# Patient Record
Sex: MALE | Race: WHITE
[De-identification: names, ages, dates, MRNs, and addresses within clinical notes are randomized per-mention and may not be internally consistent; named-entity substitution may affect disease eponyms.]

---

## 2017-01-25 ENCOUNTER — RX RENEWAL (OUTPATIENT)
Age: 82
End: 2017-01-25

## 2017-01-25 DIAGNOSIS — H59.099 OTHER DISORDERS OF UNSPECIFIED EYE FOLLOWING CATARACT SURGERY: ICD-10-CM

## 2017-01-25 RX ORDER — PREDNISOLONE ACETATE 10 MG/ML
1 SUSPENSION/ DROPS OPHTHALMIC
Qty: 5 | Refills: 3 | Status: ACTIVE | COMMUNITY
Start: 2017-01-25 | End: 1900-01-01

## 2017-01-25 RX ORDER — OFLOXACIN 3 MG/ML
0.3 SOLUTION/ DROPS OPHTHALMIC
Qty: 5 | Refills: 3 | Status: ACTIVE | COMMUNITY
Start: 2017-01-25 | End: 1900-01-01

## 2017-01-30 ENCOUNTER — OUTPATIENT (OUTPATIENT)
Dept: OUTPATIENT SERVICES | Facility: HOSPITAL | Age: 82
LOS: 1 days | Discharge: HOME | End: 2017-01-30
Payer: MEDICARE

## 2017-01-30 ENCOUNTER — APPOINTMENT (OUTPATIENT)
Dept: OPHTHALMOLOGY | Facility: AMBULATORY SURGERY CENTER | Age: 82
End: 2017-01-30

## 2017-01-30 PROCEDURE — 66984 XCAPSL CTRC RMVL W/O ECP: CPT | Mod: RT

## 2017-01-31 ENCOUNTER — APPOINTMENT (OUTPATIENT)
Dept: OPHTHALMOLOGY | Facility: CLINIC | Age: 82
End: 2017-01-31

## 2017-02-01 DIAGNOSIS — H26.9 UNSPECIFIED CATARACT: ICD-10-CM

## 2017-02-01 DIAGNOSIS — Z79.01 LONG TERM (CURRENT) USE OF ANTICOAGULANTS: ICD-10-CM

## 2017-02-01 DIAGNOSIS — Z87.891 PERSONAL HISTORY OF NICOTINE DEPENDENCE: ICD-10-CM

## 2017-02-01 DIAGNOSIS — Z84.1 FAMILY HISTORY OF DISORDERS OF KIDNEY AND URETER: ICD-10-CM

## 2017-02-01 DIAGNOSIS — Z96.653 PRESENCE OF ARTIFICIAL KNEE JOINT, BILATERAL: ICD-10-CM

## 2017-02-01 DIAGNOSIS — I48.2 CHRONIC ATRIAL FIBRILLATION: ICD-10-CM

## 2017-02-01 DIAGNOSIS — I10 ESSENTIAL (PRIMARY) HYPERTENSION: ICD-10-CM

## 2017-02-07 ENCOUNTER — APPOINTMENT (OUTPATIENT)
Dept: OPHTHALMOLOGY | Facility: CLINIC | Age: 82
End: 2017-02-07

## 2017-02-08 ENCOUNTER — RX RENEWAL (OUTPATIENT)
Age: 82
End: 2017-02-08

## 2017-02-08 RX ORDER — OFLOXACIN 3 MG/ML
0.3 SOLUTION/ DROPS OPHTHALMIC
Qty: 5 | Refills: 3 | Status: ACTIVE | COMMUNITY
Start: 2017-02-08 | End: 1900-01-01

## 2017-02-08 RX ORDER — PREDNISOLONE ACETATE 10 MG/ML
1 SUSPENSION/ DROPS OPHTHALMIC
Qty: 5 | Refills: 3 | Status: ACTIVE | COMMUNITY
Start: 2017-02-08 | End: 1900-01-01

## 2017-02-13 ENCOUNTER — APPOINTMENT (OUTPATIENT)
Dept: OPHTHALMOLOGY | Facility: AMBULATORY SURGERY CENTER | Age: 82
End: 2017-02-13

## 2017-02-13 ENCOUNTER — OUTPATIENT (OUTPATIENT)
Dept: OUTPATIENT SERVICES | Facility: HOSPITAL | Age: 82
LOS: 1 days | Discharge: HOME | End: 2017-02-13
Payer: MEDICARE

## 2017-02-13 PROCEDURE — 66984 XCAPSL CTRC RMVL W/O ECP: CPT | Mod: 79,LT

## 2017-02-14 ENCOUNTER — APPOINTMENT (OUTPATIENT)
Dept: OPHTHALMOLOGY | Facility: CLINIC | Age: 82
End: 2017-02-14

## 2017-02-17 DIAGNOSIS — I10 ESSENTIAL (PRIMARY) HYPERTENSION: ICD-10-CM

## 2017-02-17 DIAGNOSIS — H25.12 AGE-RELATED NUCLEAR CATARACT, LEFT EYE: ICD-10-CM

## 2017-02-17 DIAGNOSIS — I48.91 UNSPECIFIED ATRIAL FIBRILLATION: ICD-10-CM

## 2017-02-21 ENCOUNTER — APPOINTMENT (OUTPATIENT)
Dept: OPHTHALMOLOGY | Facility: CLINIC | Age: 82
End: 2017-02-21

## 2017-03-16 ENCOUNTER — APPOINTMENT (OUTPATIENT)
Dept: OPHTHALMOLOGY | Facility: CLINIC | Age: 82
End: 2017-03-16

## 2017-06-01 ENCOUNTER — APPOINTMENT (OUTPATIENT)
Dept: OPHTHALMOLOGY | Facility: CLINIC | Age: 82
End: 2017-06-01

## 2018-01-05 ENCOUNTER — APPOINTMENT (OUTPATIENT)
Dept: OPHTHALMOLOGY | Facility: CLINIC | Age: 83
End: 2018-01-05
Payer: MEDICARE

## 2018-02-02 ENCOUNTER — APPOINTMENT (OUTPATIENT)
Dept: OPHTHALMOLOGY | Facility: CLINIC | Age: 83
End: 2018-02-02
Payer: MEDICARE

## 2018-02-02 PROCEDURE — 92014 COMPRE OPH EXAM EST PT 1/>: CPT

## 2019-01-23 ENCOUNTER — HOSPITAL ENCOUNTER (INPATIENT)
Dept: HOSPITAL 74 - JER | Age: 84
LOS: 5 days | Discharge: NURSING, REHAB OR LONG-TERM CARE | DRG: 482 | End: 2019-01-28
Attending: INTERNAL MEDICINE | Admitting: INTERNAL MEDICINE
Payer: COMMERCIAL

## 2019-01-23 VITALS — BODY MASS INDEX: 27.9 KG/M2

## 2019-01-23 DIAGNOSIS — Z79.01: ICD-10-CM

## 2019-01-23 DIAGNOSIS — N40.0: ICD-10-CM
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
## 2019-01-23 DIAGNOSIS — R55: ICD-10-CM

## 2019-01-23 DIAGNOSIS — I48.2: ICD-10-CM
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
## 2019-01-23 DIAGNOSIS — S72.142A: Primary | ICD-10-CM
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
## 2019-01-23 DIAGNOSIS — I95.9: ICD-10-CM

## 2019-01-23 DIAGNOSIS — I10: ICD-10-CM

## 2019-01-23 LAB
ALBUMIN SERPL-MCNC: 3.7 G/DL (ref 3.4–5)
ALP SERPL-CCNC: 56 U/L (ref 45–117)
ALT SERPL-CCNC: 30 U/L (ref 13–61)
ANION GAP SERPL CALC-SCNC: 7 MMOL/L (ref 8–16)
AST SERPL-CCNC: 29 U/L (ref 15–37)
BASOPHILS # BLD: 0.4 % (ref 0–2)
BILIRUB SERPL-MCNC: 0.6 MG/DL (ref 0.2–1)
BUN SERPL-MCNC: 21 MG/DL (ref 7–18)
CALCIUM SERPL-MCNC: 8.4 MG/DL (ref 8.5–10.1)
CHLORIDE SERPL-SCNC: 106 MMOL/L (ref 98–107)
CO2 SERPL-SCNC: 31 MMOL/L (ref 21–32)
CREAT SERPL-MCNC: 1 MG/DL (ref 0.55–1.3)
DEPRECATED RDW RBC AUTO: 13.2 % (ref 11.9–15.9)
EOSINOPHIL # BLD: 0.9 % (ref 0–4.5)
GLUCOSE SERPL-MCNC: 155 MG/DL (ref 74–106)
HCT VFR BLD CALC: 47.5 % (ref 35.4–49)
HGB BLD-MCNC: 16.3 GM/DL (ref 11.7–16.9)
INR BLD: 1.27 (ref 0.83–1.09)
LYMPHOCYTES # BLD: 10.1 % (ref 8–40)
MCH RBC QN AUTO: 33.9 PG (ref 25.7–33.7)
MCHC RBC AUTO-ENTMCNC: 34.2 G/DL (ref 32–35.9)
MCV RBC: 99 FL (ref 80–96)
MONOCYTES # BLD AUTO: 3.7 % (ref 3.8–10.2)
NEUTROPHILS # BLD: 84.9 % (ref 42.8–82.8)
PLATELET # BLD AUTO: 147 K/MM3 (ref 134–434)
PMV BLD: 9.9 FL (ref 7.5–11.1)
POTASSIUM SERPLBLD-SCNC: 4.4 MMOL/L (ref 3.5–5.1)
PROT SERPL-MCNC: 6.6 G/DL (ref 6.4–8.2)
PT PNL PPP: 15 SEC (ref 9.7–13)
RBC # BLD AUTO: 4.8 M/MM3 (ref 4–5.6)
SODIUM SERPL-SCNC: 143 MMOL/L (ref 136–145)
WBC # BLD AUTO: 8.7 K/MM3 (ref 4–10)

## 2019-01-23 NOTE — CONSULT
Consult - text type





- Consultation


Consultation Note: 





 ORTHOPEDIC SURGERY CONSULTATION NOTE


                             Department of Orthopedic Surgery





HISTORY OF PRESENT ILLNESS





Mr. Flores is a 85 year old male with a past medical history of afib (on Eliquis

) and HTN, who presents to the [facility] ED after a mechanical slip and fall at home. 

The orthopedic service was consulted for a left hip fracture. The patient notes 

significant pain in his left hip, which improves when he doesn't move. He 

states he was unable to ambulate after his fall. Denies any other injuries. 

Denies numbness, tingling or other constitutional complaints. The patient works 

is currently retired but worked as a  in the past. Denies tobacco use, 

drug use, alcohol abuse. The patient lives alone at home but in the same 

building as his daughter Maureen, and uses no assistive devices at baseline. He 

has a history of bilateral knee replacements by Dr. Maynard in [place] years 

ago.





FAMILY HISTORY


na





REVIEW OF SYMPTOMS 


A twelve-point review of systems was performed and was negative except as noted 

in HPI.





PHYSICAL EXAM





Constitutional: Alert and oriented to person, place, and time. Appears well-

developed and well-nourished. No acute distress, appropriate mood and affect. 





Pulmonary: Breathing comfortably, normal air movement, no audible wheezing.





Right Upper Extremity: Skin warm, dry, and intact; no lesions, rashes or ulcers 

noted. Muscle mass equal and symmetric to contralateral side. No atrophy noted. 

No masses or effusions noted. No tenderness to palpation all joints; nontender 

throughout rest of extremity. Full passive and active ROM, free from pain. 

Joints stable with no pathologic laxity. M/R/U/MSK/AX motor intact; SILT 

distally; 2+ radial pulses; Cap refill brisk. Tone and reflexes normal. 





Left Upper Extremity: Skin warm, dry, and intact; no lesions, rashes or ulcers 

noted. Muscle mass equal and symmetric to contralateral side. No atrophy noted. 

No masses or effusions noted. No tenderness to palpation all joints; nontender 

throughout rest of extremity. LROM passive and active ROM secondary to history 

of osteoarthritis of the left shoulder, currently free from pain. Joints stable 

with no pathologic laxity. M/R/U/MSK/AX motor intact; SILT distally; 2+ radial 

pulses; Cap refill brisk. Tone and reflexes normal.





Right Lower Extremity: Skin warm, dry, and intact; no lesions, rashes or ulcers 

noted. Muscle mass equal and symmetric to contralateral side. No atrophy noted. 

No masses or effusions noted. No tenderness to palpation all joints; nontender 

throughout rest of extremity. No cords or calf tenderness


No significant calf/ankle edema. Full passive and active ROM, free from pain. 

Joints stable with no pathologic laxity. EHL/TA/GS motor intact; SILT distally; 

2+ DP pulses; Cap refill brisk. Tone and reflexes normal.





Left Lower Extremity: Skin warm, dry, and intact; no lesions, rashes or ulcers 

noted. Muscle mass equal and symmetric to contralateral side. No atrophy noted. 

LLE is shortened and externally rotated. Tender to palpation at the left hip 

and proximal thigh; nontender at the knee and ankle. No cords or calf tenderness


No significant calf/ankle edema. Full passive and active ROM of the knee and 

ankle, free from pain. Joints stable with no pathologic laxity. EHL/TA/GS motor 

intact; SILT distally; 2+ DP pulses; Cap refill brisk. Tone and reflexes 

normal. Patient is unable to SLR, and has pain with log roll.





Social History





Smoking history                  Never smoked


If you are a former smoker,      2004


when did you quit?               


Hx Alcohol Use                   Yes: Occasional





Allergies











Allergy/AdvReac Type Severity Reaction Status Date / Time


 


No Known Allergies Allergy   Verified 01/23/19 21:25








Vital Signs (last)











Temp Pulse Resp BP Pulse Ox


 


 97.8 F   90   19   120/71   98 


 


 01/23/19 21:04  01/23/19 21:04  01/23/19 21:04  01/23/19 21:04  01/23/19 21:04








Intake and Output











 01/21/19 01/22/19 01/23/19





 23:59 23:59 23:59


 


Other:   


 


  Weight   195 lb


 


  Height   5 ft 7 in


 


  Body Mass Index (BMI)   30.5


 


  Weight Measurement Method   Est/Stated by Patient














IMAGING





I personally reviewed all radiographs, CT, and other imaging. They demonstrate 

a left intertrochanteric hip fracture.





ASSESSMENT AND PLAN





Mr. Flores is an 85 year old male presenting status post mechanical fall with a 

left sided intertrochanteric hip fracture. We have reviewed the imaging and 

clinical findings in detail, as well as their potential implications. This is 

an operative fracture.





 Admit to medical team


 NPO past midnight


- Hold anticoagulation


 NWB LLE


 CBC/BMP/Coags


 Type and Screen


 LR 84cc/hr


 EKG


 CXR


 UA


- Needs Medical / Cardiology clearance for surgery


- Plan for surgery 1/24/19





All questions were answered. Thank you for involving our team in the care of 

this patient. Please call us at [phone] with questions.

## 2019-01-23 NOTE — PDOC
History of Present Illness





- General


Chief Complaint: Injury


Stated Complaint: FALL


Time Seen by Provider: 01/23/19 21:38





- History of Present Illness


Initial Comments: 


 85 year old male with history of atrial fibrillation (Eliquis) and 

hypertension presenting with left hip pain and inability to move it after 

falling today at home. Patient had a seemingly mechanical tip and fall while 

ambulating with slippers that had a raised edge on them. Patient states that 

the rim got caught on the floor and he tripped falling backward and sideways 

onto his left hip. Denies head trauma, LOC, syncope, palpitations, nausea, 

vomiting, visual deficits, FND or other symptoms.


01/23/19 22:41








01/23/19 23:25








Past History





- Past Medical History


Allergies/Adverse Reactions: 


 Allergies











Allergy/AdvReac Type Severity Reaction Status Date / Time


 


No Known Allergies Allergy   Verified 01/23/19 21:25











Home Medications: 


Ambulatory Orders





Apixaban [Eliquis] 5 mg PO BID 01/24/19 


Donepezil HCl 5 mg PO DAILY 01/24/19 


Lisinopril [Zestril] 40 mg PO DAILY 01/24/19 


Tamsulosin HCl [Flomax] 0.4 cap PO HS 01/24/19 








Cardiac Disorders: Yes (ATRIAL FIB.)


HTN: Yes





- Suicide/Smoking/Psychosocial Hx


Smoking History: Never smoked


Have you smoked in the past 12 months: No


If you are a former smoker, when did you quit?: 2004


Information on smoking cessation initiated: No


Hx Alcohol Use: Yes (Occasional)


Drug/Substance Use Hx: No


Substance Use Type: Alcohol





**Review of Systems





- Review of Systems


Constitutional: No: Chills, Diaphoresis, Fever, Loss of Appetite


HEENTM: No: Blurred Vision, Tearing


Respiratory: No: Cough, Orthopnea, Shortness of Breath


Cardiac (ROS): No: Chest Pain, Edema, Irregular Heart Rate


ABD/GI: No: Diarrhea, Nausea, Vomiting


: No: Burning, Dysuria, Frequency


Musculoskeletal: Yes: Joint Pain, Other (deformity)


Neurological: No: Headache, Numbness, Paresthesia, Tingling, Tremors


Psychiatric: No: Anxiety, Depression


Endocrine: No: Intolerance to Cold, Intolerance to Heat


Hematologic/Lymphatic: Yes: Easy Bleeding.  No: Anemia, Blood Clots





*Physical Exam





- Vital Signs


 Last Vital Signs











Temp Pulse Resp BP Pulse Ox


 


 97.8 F   90   19   120/71   98 


 


 01/23/19 21:04  01/23/19 21:04  01/23/19 21:04  01/23/19 21:04  01/23/19 21:04














- Physical Exam


General Appearance: Yes: Nourished, Appropriately Dressed.  No: Apparent 

Distress


HEENT: positive: EOMI, CHERIE, Normal ENT Inspection, Normal Voice


Neck: positive: Trachea midline, Normal Thyroid, Supple.  negative: Tender, 

Rigid


Respiratory/Chest: positive: Lungs Clear, Normal Breath Sounds.  negative: 

Chest Tender, Respiratory Distress


Cardiovascular: positive: Regular Rhythm, Regular Rate


Gastrointestinal/Abdominal: positive: Normal Bowel Sounds, Flat, Soft.  negative

: Tender


Lymphatic: negative: Adenopathy, Tenderness


Musculoskeletal: positive: Decreased Range of Motion.  negative: Normal 

Inspection


Extremity: positive: Normal Capillary Refill, Tender.  negative: Normal 

Inspection, Normal Range of Motion (externally rotated and shortened left lower 

extremity with deformity at hip.)


Integumentary: positive: Normal Color, Dry, Warm


Neurologic: positive: Fully Oriented, Alert, Normal Mood/Affect, Other (

bilateral pedal pulses and sensation intact).  negative: Motor Strength 5/5





Moderate Sedation





- Procedure Monitoring


Vital Signs: 


Procedure Monitoring Vital Signs











Temperature  97.8 F   01/23/19 21:04


 


Pulse Rate  90   01/23/19 21:04


 


Respiratory Rate  19   01/23/19 21:04


 


Blood Pressure  120/71   01/23/19 21:04


 


O2 Sat by Pulse Oximetry (%)  98   01/23/19 21:04











ED Treatment Course





- LABORATORY


CBC & Chemistry Diagram: 


 01/28/19 07:41





 01/28/19 07:41





Medical Decision Making





- Medical Decision Making


 85 year old male with PMH of afib (on Eliquis) and HTN presenting with left 

hip deformity and pain concerning for left hip fracture corroborated on the 

left hip film. This was a completely mechanical trip and fall. Dr. Mei 

consulted and saw the patient. Labs pending and admission pending. Last dose of 

Eliquis was 11:30 this morning. 


01/23/19 23:02





Patient admitted to hospitalist team with Dr. Mei as the consult.











*DC/Admit/Observation/Transfer


Diagnosis at time of Disposition: 


Hip fracture


Qualifiers:


 Encounter type: initial encounter Fracture type: closed Laterality: left 

Qualified Code(s): S72.002A - Fracture of unspecified part of neck of left femur

, initial encounter for closed fracture








- Discharge Dispostion


Disposition: SKILLED NURSING FACILITY


Condition at time of disposition: Stable





- Referrals





- Patient Instructions





- Post Discharge Activity

## 2019-01-23 NOTE — PDOC
Attending Attestation





- HPI


HPI: 





01/23/19 22:43


The patient is an 85-year-old male, with a past medical history of afib (on 

Xarelto) and HTN, who presents to the ED s/p mechanical slip and fall at home. 

The patient states that he slipped on his slippers and landed on his left hip. 

Patient reports experiencing immediate pain to the area and required assistance 

to get back up. On exam, patients left leg is externally rotated and 

shortened. Family denies any head trauma, loss of consciousness, or any other 

injuries or symptoms. 











- Physicial Exam


PE: 


01/23/19 22:43


Agree with resident's exam. 





<Susan Adamson - Last Filed: 01/23/19 22:43>





- Resident


Resident Name: Sagrario Arredondo





- ED Attending Attestation


I have performed the following: I have examined & evaluated the patient, The 

case was reviewed & discussed with the resident, I agree w/resident's findings 

& plan





- Medical Decision Making





01/23/19 23:23


85-year-old male status post mechanical fall with left hip pain


X-rays are consistent with acute intertrochanteric fracture with free floating 

lesser trochanter left hip


Patient seen by orthopedics at the bedside


He will be admitted to medical service for further management





Impression left hip fracture


Mechanical fall





<Yvette Luna - Last Filed: 01/23/19 23:26>





Attestations





- Attestations


01/23/19 22:44





Documentation prepared by Susan Adamson, acting as medical scribe for Yvette Luna DO.





<Susan Adamson - Last Filed: 01/23/19 22:43>

## 2019-01-24 LAB
ANION GAP SERPL CALC-SCNC: 6 MMOL/L (ref 8–16)
ANION GAP SERPL CALC-SCNC: 9 MMOL/L (ref 8–16)
BASOPHILS # BLD: 0.2 % (ref 0–2)
BASOPHILS # BLD: 0.3 % (ref 0–2)
BUN SERPL-MCNC: 16 MG/DL (ref 7–18)
BUN SERPL-MCNC: 19 MG/DL (ref 7–18)
CALCIUM SERPL-MCNC: 7.9 MG/DL (ref 8.5–10.1)
CALCIUM SERPL-MCNC: 7.9 MG/DL (ref 8.5–10.1)
CHLORIDE SERPL-SCNC: 107 MMOL/L (ref 98–107)
CHLORIDE SERPL-SCNC: 108 MMOL/L (ref 98–107)
CO2 SERPL-SCNC: 28 MMOL/L (ref 21–32)
CO2 SERPL-SCNC: 28 MMOL/L (ref 21–32)
CREAT SERPL-MCNC: 1 MG/DL (ref 0.55–1.3)
CREAT SERPL-MCNC: 1 MG/DL (ref 0.55–1.3)
DEPRECATED RDW RBC AUTO: 13.2 % (ref 11.9–15.9)
DEPRECATED RDW RBC AUTO: 13.3 % (ref 11.9–15.9)
EOSINOPHIL # BLD: 0.2 % (ref 0–4.5)
EOSINOPHIL # BLD: 0.7 % (ref 0–4.5)
GLUCOSE SERPL-MCNC: 142 MG/DL (ref 74–106)
GLUCOSE SERPL-MCNC: 162 MG/DL (ref 74–106)
HCT VFR BLD CALC: 42.9 % (ref 35.4–49)
HCT VFR BLD CALC: 43.6 % (ref 35.4–49)
HGB BLD-MCNC: 14.7 GM/DL (ref 11.7–16.9)
HGB BLD-MCNC: 15 GM/DL (ref 11.7–16.9)
LYMPHOCYTES # BLD: 7 % (ref 8–40)
LYMPHOCYTES # BLD: 8.8 % (ref 8–40)
MAGNESIUM SERPL-MCNC: 2 MG/DL (ref 1.8–2.4)
MCH RBC QN AUTO: 33.5 PG (ref 25.7–33.7)
MCH RBC QN AUTO: 34.9 PG (ref 25.7–33.7)
MCHC RBC AUTO-ENTMCNC: 33.8 G/DL (ref 32–35.9)
MCHC RBC AUTO-ENTMCNC: 35 G/DL (ref 32–35.9)
MCV RBC: 99.2 FL (ref 80–96)
MCV RBC: 99.9 FL (ref 80–96)
MONOCYTES # BLD AUTO: 2.1 % (ref 3.8–10.2)
MONOCYTES # BLD AUTO: 5.7 % (ref 3.8–10.2)
NEUTROPHILS # BLD: 84.6 % (ref 42.8–82.8)
NEUTROPHILS # BLD: 90.4 % (ref 42.8–82.8)
PHOSPHATE SERPL-MCNC: 3.7 MG/DL (ref 2.5–4.9)
PLATELET # BLD AUTO: 128 K/MM3 (ref 134–434)
PLATELET # BLD AUTO: 141 K/MM3 (ref 134–434)
PMV BLD: 10 FL (ref 7.5–11.1)
PMV BLD: 10.3 FL (ref 7.5–11.1)
POTASSIUM SERPLBLD-SCNC: 4.1 MMOL/L (ref 3.5–5.1)
POTASSIUM SERPLBLD-SCNC: 4.1 MMOL/L (ref 3.5–5.1)
RBC # BLD AUTO: 4.3 M/MM3 (ref 4–5.6)
RBC # BLD AUTO: 4.4 M/MM3 (ref 4–5.6)
SODIUM SERPL-SCNC: 142 MMOL/L (ref 136–145)
SODIUM SERPL-SCNC: 144 MMOL/L (ref 136–145)
WBC # BLD AUTO: 10 K/MM3 (ref 4–10)
WBC # BLD AUTO: 9.2 K/MM3 (ref 4–10)

## 2019-01-24 PROCEDURE — 0QS704Z REPOSITION LEFT UPPER FEMUR WITH INTERNAL FIXATION DEVICE, OPEN APPROACH: ICD-10-PCS | Performed by: ORTHOPAEDIC SURGERY

## 2019-01-24 RX ADMIN — SODIUM CHLORIDE, POTASSIUM CHLORIDE, SODIUM LACTATE AND CALCIUM CHLORIDE SCH MLS: 600; 310; 30; 20 INJECTION, SOLUTION INTRAVENOUS at 20:00

## 2019-01-24 NOTE — PN
Progress Note (short form)





- Note


Progress Note: 





 ORTHOPEDIC SURGERY PROGRESS NOTE


                             Department of Orthopedic Surgery





SUBJECTIVE





No acute events overnight. No complaints currently. Denies chest pain, 

shortness of breath, or calf pain. No nausea or vomiting. Tolerating oral 

intake. Pain control difficult overnight, but improving. 





PHYSICAL EXAMINATION


General: Alert, oriented, cooperative and no distress.





Left Lower Extremity: Skin warm, dry, and intact; no lesions, rashes or ulcers 

noted. Muscle mass equal and symmetric to contralateral side. No atrophy noted. 

LLE is shortened and externally rotated. Tender to palpation at the left hip 

and proximal thigh; nontender at the knee and ankle. No cords or calf tenderness


No significant calf/ankle edema. Full passive and active ROM of the knee and 

ankle, free from pain. Joints stable with no pathologic laxity. EHL/TA/GS motor 

intact; SILT distally; 2+ DP pulses; Cap refill brisk. Tone and reflexes 

normal. Patient is unable to SLR, and has pain with log roll.





DVT Exam: No evidence of DVT seen on physical exam; No cords or calf tenderness

; No significant calf/ankle edema.





Intake & Output











 01/22/19 01/23/19 01/24/19





 23:59 23:59 23:59


 


Other:   


 


  Voiding Method   Urinal


 


  Weight  195 lb 


 


  Height  5 ft 7 in 


 


  Body Mass Index (BMI)  30.5 


 


  Weight Measurement Method  Est/Stated by Patient 








Active Medications











Generic Name Dose Route Start Last Admin





  Trade Name Freq  PRN Reason Stop Dose Admin


 


Acetaminophen  500 mg  01/24/19 05:00  





  Ofirmev Injection -  IVPB  01/25/19 05:01  





  Q8H RUCHI   





     





     





     





     


 


Lactated Ringer's  1,000 mls @ 83 mls/hr  01/24/19 02:45  01/24/19 04:09





  Lactated Ringers Solution  IV   83 mls/hr





  ASDIR RUCHI   Administration





     





     





     





     


 


Lisinopril  20 mg  01/24/19 10:00  





  Prinivil  PO   





  DAILY RUCHI   





     





     





     





     


 


Morphine Sulfate  2 mg  01/24/19 02:46  





  Morphine Sulfate  IVPUSH   





  Q6H PRN   





  PAIN LEVEL 7 - 10   





     





     





     








Vital Signs (last)











Temp Pulse Resp BP Pulse Ox


 


 97.8 F   90   19   120/71   98 


 


 01/23/19 21:04  01/23/19 21:04  01/23/19 21:04  01/23/19 21:04  01/24/19 02:10








Laboratory (coagulation)











PT with INR  15.00 SEC (9.7-13.0)  H  01/23/19  22:35    








Laboratory





 01/24/19 05:18 











ASSESSMENT AND PLAN





Mr. Flores is an 85 year old male presenting status post mechanical fall with a 

left sided intertrochanteric hip fracture. We have reviewed the imaging and 

clinical findings in detail, as well as their potential implications. This is 

an operative fracture.





 NPO 


- Hold anticoagulation


 NWB LLE


 CBC/BMP/Coags


 Type and Screen


 LR 84cc/hr


 EKG


 CXR


 UA


- Needs clearance for surgery


- Plan for surgery for today 1/24/19.





All questions were answered. Thank you for involving our team in the care of 

this patient. Please call us at [phone] with questions.

## 2019-01-24 NOTE — EKG
Test Reason : 

Blood Pressure : ***/*** mmHG

Vent. Rate : 086 BPM     Atrial Rate : 394 BPM

   P-R Int : 000 ms          QRS Dur : 080 ms

    QT Int : 368 ms       P-R-T Axes : 000 016 056 degrees

   QTc Int : 440 ms

 

ATRIAL FIBRILLATION

NONSPECIFIC T WAVE ABNORMALITY

ABNORMAL ECG

NO PREVIOUS ECGS AVAILABLE

Confirmed by JOSEFA HOOVER MD ([id number]) on 1/24/2019 12:22:55 PM

 

Referred By:             Confirmed By:JOSEFA HOOVER MD

## 2019-01-24 NOTE — PN
Teaching Attending Note


Name of Resident: Eliecer Obrien





ATTENDING PHYSICIAN STATEMENT





I saw and evaluated the patient.


I reviewed the resident's note and discussed the case with the resident.


I agree with the resident's findings and plan as documented.








SUBJECTIVE: Patient has pain in his left groin. 








OBJECTIVE:


 Vital Signs











 Period  Temp  Pulse  Resp  BP Sys/Nazario  Pulse Ox


 


 Last 24 Hr  97.8 F-98.1 F  90-92  16-19  112-120/71-81  98-98








HEART: Irregular


LUNGS: Clear


ABDOMEN: Soft, non-tender, non-distended, normal BS


EXTREMITIES: No edema. Left leg shortened and externally rotated








 Laboratory Results - last 24 hr











  01/23/19 01/23/19 01/23/19





  22:35 22:35 22:35


 


WBC  8.7  


 


RBC  4.80  


 


Hgb  16.3  


 


Hct  47.5  


 


MCV  99.0 H  


 


MCH  33.9 H  


 


MCHC  34.2  


 


RDW  13.2  


 


Plt Count  147  


 


MPV  9.9  


 


Absolute Neuts (auto)  7.4  


 


Neutrophils %  84.9 H  


 


Lymphocytes %  10.1  D  


 


Monocytes %  3.7 L  


 


Eosinophils %  0.9  D  


 


Basophils %  0.4  


 


Nucleated RBC %  0  


 


PT with INR   15.00 H 


 


INR   1.27 H 


 


Sodium    143


 


Potassium    4.4


 


Chloride    106


 


Carbon Dioxide    31


 


Anion Gap    7 L


 


BUN    21 H


 


Creatinine    1.0


 


Creat Clearance w eGFR    > 60


 


Random Glucose    155 H


 


Hemoglobin A1c %   


 


Calcium    8.4 L


 


Phosphorus   


 


Magnesium   


 


Total Bilirubin    0.6


 


AST    29


 


ALT    30


 


Alkaline Phosphatase    56


 


Total Protein    6.6


 


Albumin    3.7


 


Vitamin B12   


 


Serum Folate   


 


Blood Type   


 


Antibody Screen   














  01/23/19 01/24/19 01/24/19





  22:35 04:35 04:35


 


WBC   


 


RBC   


 


Hgb   


 


Hct   


 


MCV   


 


MCH   


 


MCHC   


 


RDW   


 


Plt Count   


 


MPV   


 


Absolute Neuts (auto)   


 


Neutrophils %   


 


Lymphocytes %   


 


Monocytes %   


 


Eosinophils %   


 


Basophils %   


 


Nucleated RBC %   


 


PT with INR   


 


INR   


 


Sodium   


 


Potassium   


 


Chloride   


 


Carbon Dioxide   


 


Anion Gap   


 


BUN   


 


Creatinine   


 


Creat Clearance w eGFR   


 


Random Glucose   


 


Hemoglobin A1c %    6.5 H


 


Calcium   


 


Phosphorus   


 


Magnesium   


 


Total Bilirubin   


 


AST   


 


ALT   


 


Alkaline Phosphatase   


 


Total Protein   


 


Albumin   


 


Vitamin B12   332 


 


Serum Folate   14 


 


Blood Type  Cancelled  


 


Antibody Screen  Cancelled  














  01/24/19 01/24/19





  05:18 05:18


 


WBC  9.2 


 


RBC  4.40 


 


Hgb  14.7 


 


Hct  43.6 


 


MCV  99.2 H 


 


MCH  33.5 


 


MCHC  33.8 


 


RDW  13.3 


 


Plt Count  141 


 


MPV  10.3 


 


Absolute Neuts (auto)  7.8 


 


Neutrophils %  84.6 H 


 


Lymphocytes %  8.8 


 


Monocytes %  5.7 


 


Eosinophils %  0.7 


 


Basophils %  0.2 


 


Nucleated RBC %  0 


 


PT with INR  


 


INR  


 


Sodium   142


 


Potassium   4.1


 


Chloride   108 H


 


Carbon Dioxide   28


 


Anion Gap   6 L


 


BUN   19 H


 


Creatinine   1.0


 


Creat Clearance w eGFR   > 60


 


Random Glucose   142 H


 


Hemoglobin A1c %  


 


Calcium   7.9 L


 


Phosphorus   3.7


 


Magnesium   2.0


 


Total Bilirubin  


 


AST  


 


ALT  


 


Alkaline Phosphatase  


 


Total Protein  


 


Albumin  


 


Vitamin B12  


 


Serum Folate  


 


Blood Type  


 


Antibody Screen  








 Current Medications











Generic Name Dose Route Start Last Admin





  Trade Name Freq  PRN Reason Stop Dose Admin


 


Acetaminophen  500 mg  01/24/19 05:00  





  Ofirmev Injection -  IVPB  01/25/19 05:01  





  Q8H RUCHI   





     





     





     





     


 


Lactated Ringer's  1,000 mls @ 83 mls/hr  01/24/19 02:45  01/24/19 04:09





  Lactated Ringers Solution  IV   83 mls/hr





  ASDIR RUCHI   Administration





     





     





     





     


 


Lisinopril  20 mg  01/24/19 10:00  01/24/19 10:47





  Prinivil  PO   20 mg





  DAILY RUCHI   Administration





     





     





     





     


 


Morphine Sulfate  2 mg  01/24/19 02:46  01/24/19 10:44





  Morphine Sulfate  IVPUSH   2 mg





  Q6H PRN   Administration





  PAIN LEVEL 7 - 10   





     





     





     














ASSESSMENT AND PLAN:


This is an 85 year old man with a history of atrial fib, HTN who presented to 

the ED with left hip pain after a fall. 





1. Intertrochanteric left femur fracture


   - Plan for surgery today


2. Permanent atrial fibrillation


   - Rate controlled


   - Hold Eliquis for surgery


3. HTN


   - Continue lisinopril

## 2019-01-24 NOTE — CON.CARD
Cardiology Consult (text)





- Consultation


Consultation Note: 





cc:  fall, hip pain





hpi:  85 m hx afib, htn, here s/p fall.  Had mechanical fall and found to have 

hip fx.  No prodrome sxs, no loc.  No cp sob palps dizzy loc pnd orthopnea le 

edema.  Plans for OR for hip surgery.  Sees cardio at [facility].





pmh: per hpi


psh: knee surgery


social: ex tob


ros: per hpi; no nvd wt loss gib hematuria dysuria abd pain cough fever


fam: nc


meds:


 Home Medications











 Medication  Instructions  Recorded


 


Lisinopril [Prinivil -] 20 mg PO DAILY 03/24/16


 


Apixaban [Eliquis] 5 mg PO BID 01/24/19








pe:


 Vital Signs











 Period  Temp  Pulse  Resp  BP Sys/Nazario  Pulse Ox


 


 Last 24 Hr  97.8 F-98.1 F  90-92  16-19  112-120/71-81  98-98








nad no jvd


irreg s1s2 no mrg


cta bl nl eff


aaox3


no le e/c/c


abd nt nd pos bs


no jaundice diaphoresis


pos dp pt no carotid bruits





 Laboratory Last Values











WBC  9.2 K/mm3 (4.0-10.0)   01/24/19  05:18    


 


RBC  4.40 M/mm3 (4.00-5.60)   01/24/19  05:18    


 


Hgb  14.7 GM/dL (11.7-16.9)   01/24/19  05:18    


 


Hct  43.6 % (35.4-49)   01/24/19  05:18    


 


MCV  99.2 fl (80-96)  H  01/24/19  05:18    


 


MCH  33.5 pg (25.7-33.7)   01/24/19  05:18    


 


MCHC  33.8 g/dl (32.0-35.9)   01/24/19  05:18    


 


RDW  13.3 % (11.9-15.9)   01/24/19  05:18    


 


Plt Count  141 K/MM3 (134-434)   01/24/19  05:18    


 


MPV  10.3 fl (7.5-11.1)   01/24/19  05:18    


 


Absolute Neuts (auto)  7.8 K/mm3 (1.5-8.0)   01/24/19  05:18    


 


Neutrophils %  84.6 % (42.8-82.8)  H  01/24/19  05:18    


 


Lymphocytes %  8.8 % (8-40)   01/24/19  05:18    


 


Monocytes %  5.7 % (3.8-10.2)   01/24/19  05:18    


 


Eosinophils %  0.7 % (0-4.5)   01/24/19  05:18    


 


Basophils %  0.2 % (0-2.0)   01/24/19  05:18    


 


Nucleated RBC %  0 % (0-0)   01/24/19  05:18    


 


PT with INR  15.00 SEC (9.7-13.0)  H  01/23/19  22:35    


 


INR  1.27  (0.83-1.09)  H  01/23/19  22:35    


 


Sodium  142 mmol/L (136-145)   01/24/19  05:18    


 


Potassium  4.1 mmol/L (3.5-5.1)   01/24/19  05:18    


 


Chloride  108 mmol/L ()  H  01/24/19  05:18    


 


Carbon Dioxide  28 mmol/L (21-32)   01/24/19  05:18    


 


Anion Gap  6 MMOL/L (8-16)  L  01/24/19  05:18    


 


BUN  19 mg/dL (7-18)  H  01/24/19  05:18    


 


Creatinine  1.0 mg/dL (0.55-1.3)   01/24/19  05:18    


 


Creat Clearance w eGFR  > 60  (>60)   01/24/19  05:18    


 


Random Glucose  142 mg/dL ()  H  01/24/19  05:18    


 


Hemoglobin A1c %  6.5 % (4.2-6.3)  H  01/24/19  04:35    


 


Calcium  7.9 mg/dL (8.5-10.1)  L  01/24/19  05:18    


 


Phosphorus  3.7 mg/dL (2.5-4.9)   01/24/19  05:18    


 


Magnesium  2.0 mg/dL (1.8-2.4)   01/24/19  05:18    


 


Total Bilirubin  0.6 mg/dL (0.2-1)   01/23/19  22:35    


 


AST  29 U/L (15-37)   01/23/19  22:35    


 


ALT  30 U/L (13-61)   01/23/19  22:35    


 


Alkaline Phosphatase  56 U/L ()   01/23/19  22:35    


 


Total Protein  6.6 g/dl (6.4-8.2)   01/23/19  22:35    


 


Albumin  3.7 g/dl (3.4-5.0)   01/23/19  22:35    


 


Vitamin B12  332 pg/ml (193-986)   01/24/19  04:35    


 


Serum Folate  14 ng/mL (3.1-17.5)   01/24/19  04:35    








cxr: no chf





ecg: afib, rate 86, nl qtc, no ischemic changes








a/p:  85 m hx afib, htn, here s/p fall.





afib:


-rate controlled w/o meds


-pt on eliquis





htn:


-cont ace-i





fall, hip fx:


-mechanical fall


-no cardiac contraindications to hip surgery.  Can hold eliquis temporarily if 

needed.

## 2019-01-24 NOTE — PN
Teaching Attending Note


Name of Resident: Smiley Adames





ATTENDING PHYSICIAN STATEMENT





I saw and evaluated the patient.


I reviewed the resident's note and discussed the case with the resident.


I agree with the resident's findings and plan as documented.








SUBJECTIVE:


Seen and examined; please refer to resident note for further historical 

information.  Gerry, he presents from home with a L-hip fx and was seen by 

Dr. Mei while in the ER.  He has a history of Afib (on Eliquis per the patient

) and HTN who had a mechanical fall at home landing on his L-hip area resulting 

in fx.  No neurovascular compromise, can move his toes and has good pulses.  No 

numbness, tingling.  He doesn't use assistive devices at baseline.  He does 

have b/l kne replacements but they are remote.  He didn't inform me of any 

recent hospitalizations, infections, etc.  Confirming the medications with his 

daughter/pharmacy.  He is afebrile and hemodynamically stable.





10 sys ROS done and negative aside from HPI


PMH and PSH reviewed


FH asked and noncontributory


Socially he doesn't abuse drugs or alcohol and is a nonsmoker.


Medication list reviewed with reconciliation pending.








OBJECTIVE:


VS, labs, imaging reviewed


NAD, AAO, resting in bed.  Pain is controlled


Normal muscle tone with L-leg tender to palp at the hip and shortened and 

externally rotated; +DP/PT.  Moves all toes.  


RRR s1/2 no mgr


Lungs CTAB w/ sym exp


NT ND +BS


CN2-12 wnl, no fnd.  Affected leg appears neurovascularly intact.





Labs show 


Imaging shows a comminuted displaced L-IT fx with avulion of the greater and 

lesser trochanter with no other gross fx identified in the femoral shaft.  Knee 

replacements appear to be in allignment. CT head without acute findings. Prelim 

CT hip agrees; final report pending


CT head prelim report shows no acute issues


EKG/CXR/UA pending








ASSESSMENT AND PLAN:


Patient presents with a L-intertrochanteric hip fracture s/p fall at home.  He 

will be admitted to the medicine team with a orthopedic sgy consultation.





1) L-hip fx (IC)


-Defer management to orthopedic sgy; NPO for procedure on LR maintenance.


-Holding AC per consulting team


-NWB LLE, control pain with ATC APAP and PRN MSO4


-PT consult; bowel regimine post op


-CXR pending; UA pending.  EKG pending. 





2) Afib with CVR


-Holding AC per orthopedics


-Doesn't appear to be on any rate control at home.  Will of course verify with 

his pharmacy, but he cannot recall being on anything. Notes even from 2016 do 

not show him on any rate controlling agents.





3) HTN


-Continue Lpril perioperatively; BP controlled and asx





FENA


-LR maint.


-PRN replete


-NPO for procedure


-NWB LLE; Bedrest





***As this is not a high risk sgy, patient has no hx ischemic heart disease, he 

has no history congestive heart failure, he has no history of cerebrovascular 

disease, he is not on insulin, and his pre-operative Cr is wnl his RCRI score 

is 0 placing him as a Class I Risk.


***By the Hilliard score he has a 0.2% risk of MI or CA intraoperatively or up to 

30 days post op.  This is given his independent functional status, age of 85, 

orthopedic procedure, and Cr <1.5





Full Code

## 2019-01-24 NOTE — PN
Physical Exam: 


SUBJECTIVE: Patient seen and examined at bedside this morning. Patient states 

his fall was due to tripping over his slippers at home. Denies trauma to head, 

or loss of consciousness. Denies bowel or bladder incontinence. Patient denies 

prodromal symptoms of chest pain, palpitations, changes in vision, 

lightheadedness, dizziness. Currently, patient endorses left hip pain only with 

movement. 





OBJECTIVE:





 Vital Signs











 Period  Temp  Pulse  Resp  BP Sys/Nazario  Pulse Ox


 


 Last 24 Hr  97.8 F-98.5 F  90-92  16-19  112-120/71-81  98-98








GENERAL: The patient is awake, alert, and fully oriented, in no acute distress.


HEAD: Normocephalic, atraumatic


EYES: PERRL, extraocular movements intact, sclera anicteric. 


ENT: Oropharynx clear without exudates, moist mucous membranes.


NECK: Supple without lymphadenopathy, or JVD


LUNGS: Breath sounds equal, clear to auscultation bilaterally, no wheezes, no 

crackles, no accessory muscle use. 


HEART: Regular rate and rhythm, S1, S2 without murmur, rub or gallop.


ABDOMEN: Soft, nontender, nondistended, normoactive bowel sounds, no guarding, 

no rebound, no hepatosplenomegaly, no masses.


EXTREMITIES: 2+ radial and dorsalis pedis pulses bilateraly. Warm, well-

perfused. No lower extremity edema bilaterally. Left lower extremity shortened 

and internally rotated compared to right. Sensation intact bilateral upper and 

lower extremities. 


NEUROLOGICAL: Cranial nerves II through XII grossly intact. Normal speech, gait 

not observed due to acute fracture.


PSYCH: Normal mood, normal affect.


SKIN: Warm, dry.














 Laboratory Results - last 24 hr











  01/23/19 01/23/19 01/23/19





  22:35 22:35 22:35


 


WBC  8.7  


 


RBC  4.80  


 


Hgb  16.3  


 


Hct  47.5  


 


MCV  99.0 H  


 


MCH  33.9 H  


 


MCHC  34.2  


 


RDW  13.2  


 


Plt Count  147  


 


MPV  9.9  


 


Absolute Neuts (auto)  7.4  


 


Neutrophils %  84.9 H  


 


Lymphocytes %  10.1  D  


 


Monocytes %  3.7 L  


 


Eosinophils %  0.9  D  


 


Basophils %  0.4  


 


Nucleated RBC %  0  


 


PT with INR   15.00 H 


 


INR   1.27 H 


 


Sodium    143


 


Potassium    4.4


 


Chloride    106


 


Carbon Dioxide    31


 


Anion Gap    7 L


 


BUN    21 H


 


Creatinine    1.0


 


Creat Clearance w eGFR    > 60


 


Random Glucose    155 H


 


Hemoglobin A1c %   


 


Calcium    8.4 L


 


Phosphorus   


 


Magnesium   


 


Total Bilirubin    0.6


 


AST    29


 


ALT    30


 


Alkaline Phosphatase    56


 


Total Protein    6.6


 


Albumin    3.7


 


Vitamin B12   


 


Serum Folate   


 


Blood Type   


 


Antibody Screen   














  01/23/19 01/24/19 01/24/19





  22:35 04:35 04:35


 


WBC   


 


RBC   


 


Hgb   


 


Hct   


 


MCV   


 


MCH   


 


MCHC   


 


RDW   


 


Plt Count   


 


MPV   


 


Absolute Neuts (auto)   


 


Neutrophils %   


 


Lymphocytes %   


 


Monocytes %   


 


Eosinophils %   


 


Basophils %   


 


Nucleated RBC %   


 


PT with INR   


 


INR   


 


Sodium   


 


Potassium   


 


Chloride   


 


Carbon Dioxide   


 


Anion Gap   


 


BUN   


 


Creatinine   


 


Creat Clearance w eGFR   


 


Random Glucose   


 


Hemoglobin A1c %    6.5 H


 


Calcium   


 


Phosphorus   


 


Magnesium   


 


Total Bilirubin   


 


AST   


 


ALT   


 


Alkaline Phosphatase   


 


Total Protein   


 


Albumin   


 


Vitamin B12   332 


 


Serum Folate   14 


 


Blood Type  Cancelled  


 


Antibody Screen  Cancelled  














  01/24/19 01/24/19 01/24/19





  05:18 05:18 12:40


 


WBC  9.2  


 


RBC  4.40  


 


Hgb  14.7  


 


Hct  43.6  


 


MCV  99.2 H  


 


MCH  33.5  


 


MCHC  33.8  


 


RDW  13.3  


 


Plt Count  141  


 


MPV  10.3  


 


Absolute Neuts (auto)  7.8  


 


Neutrophils %  84.6 H  


 


Lymphocytes %  8.8  


 


Monocytes %  5.7  


 


Eosinophils %  0.7  


 


Basophils %  0.2  


 


Nucleated RBC %  0  


 


PT with INR   


 


INR   


 


Sodium   142 


 


Potassium   4.1 


 


Chloride   108 H 


 


Carbon Dioxide   28 


 


Anion Gap   6 L 


 


BUN   19 H 


 


Creatinine   1.0 


 


Creat Clearance w eGFR   > 60 


 


Random Glucose   142 H 


 


Hemoglobin A1c %   


 


Calcium   7.9 L 


 


Phosphorus   3.7 


 


Magnesium   2.0 


 


Total Bilirubin   


 


AST   


 


ALT   


 


Alkaline Phosphatase   


 


Total Protein   


 


Albumin   


 


Vitamin B12   


 


Serum Folate   


 


Blood Type    A POSITIVE


 


Antibody Screen    Negative








Active Medications











Generic Name Dose Route Start Last Admin





  Trade Name Asaq  PRN Reason Stop Dose Admin


 


Acetaminophen  500 mg  01/24/19 05:00  





  Ofirmev Injection -  IVPB  01/25/19 05:01  





  Q8H RUCHI   





     





     





     





     


 


Lactated Ringer's  1,000 mls @ 83 mls/hr  01/24/19 02:45  01/24/19 04:09





  Lactated Ringers Solution  IV   83 mls/hr





  ASDIR RUCHI   Administration





     





     





     





     


 


Lisinopril  20 mg  01/24/19 10:00  01/24/19 10:47





  Prinivil  PO   20 mg





  DAILY RUCHI   Administration





     





     





     





     


 


Morphine Sulfate  2 mg  01/24/19 02:46  01/24/19 10:44





  Morphine Sulfate  IVPUSH   2 mg





  Q6H PRN   Administration





  PAIN LEVEL 7 - 10   





     





     





     











ASSESSMENT/PLAN:


Patient is an 85 year old male with history of Afib (on Eliquis) hypertension, 

presents after mechanical fall. 





Left intratrochanteric femoral fracture s/p mechanical fall


 -Radiograph left hip, pelvis shows comminuated displaced left 

intertrochanteric fracture with avulsion of greater and lesser trochanter. 


 -CT pelvis confirms left- comminuated intertrochanteric fracture, prostatic 

enlargement. 


 -Patient evaluated by orthopedic surgery (Dr. Mei) For surgery today. Will 

follow orthopedic surgery recommendations post-operatively.


 -Holding home Eliquis. Last took Eliquis 1/23/2019 morning. 


 -IV Lactated Ringer's at 83mL/ hour 


 -Pain control with Morphine 2mg IV Q6H PRN


 -Physical therapy evaluation


 -Patient is medically optimized for orthopedic hip surgery





Afib


 -Currently holing home Eliquis. Will reinstate after orthopedic procedure, as 

appropriate


 -Patient is not on rate control medication. Monitor vital signs closely





Hypertension


 -Lisinopril 40mg PO daily





Benign prostatic hyperplasia


 -Noted incidentally upon CT scan


 -Flomax 0.4mg PO daily





FEN


 -IV Lactated Ringer's at 83mL/ hour 


 -Follow BMP


 -NPO pending orthopedic surgery





Prophylaxis


 -Holding home Eliquis pending orthopedic surgery





Disposition


 -Continue care in medical surgical floor. 





Visit type





- Emergency Visit


Emergency Visit: Yes


ED Registration Date: 01/23/19


Care time: The patient presented to the Emergency Department on the above date 

and was hospitalized for further evaluation of their emergent condition.





- New Patient


This patient is new to me today: Yes


Date on this admission: 01/24/19





- Critical Care


Critical Care patient: No





- Discharge Referral


Referred to [facility] No

## 2019-01-25 LAB
ALBUMIN SERPL-MCNC: 2.9 G/DL (ref 3.4–5)
ALP SERPL-CCNC: 44 U/L (ref 45–117)
ALT SERPL-CCNC: 20 U/L (ref 13–61)
ANION GAP SERPL CALC-SCNC: 9 MMOL/L (ref 8–16)
AST SERPL-CCNC: 16 U/L (ref 15–37)
BILIRUB SERPL-MCNC: 1 MG/DL (ref 0.2–1)
BUN SERPL-MCNC: 16 MG/DL (ref 7–18)
CALCIUM SERPL-MCNC: 7.8 MG/DL (ref 8.5–10.1)
CHLORIDE SERPL-SCNC: 106 MMOL/L (ref 98–107)
CO2 SERPL-SCNC: 26 MMOL/L (ref 21–32)
CREAT SERPL-MCNC: 1.1 MG/DL (ref 0.55–1.3)
DEPRECATED RDW RBC AUTO: 13.5 % (ref 11.9–15.9)
GLUCOSE SERPL-MCNC: 179 MG/DL (ref 74–106)
HCT VFR BLD CALC: 37.5 % (ref 35.4–49)
HGB BLD-MCNC: 13.1 GM/DL (ref 11.7–16.9)
MCH RBC QN AUTO: 34.4 PG (ref 25.7–33.7)
MCHC RBC AUTO-ENTMCNC: 34.9 G/DL (ref 32–35.9)
MCV RBC: 98.6 FL (ref 80–96)
PLATELET # BLD AUTO: 126 K/MM3 (ref 134–434)
PMV BLD: 9.4 FL (ref 7.5–11.1)
POTASSIUM SERPLBLD-SCNC: 4.2 MMOL/L (ref 3.5–5.1)
PROT SERPL-MCNC: 5.3 G/DL (ref 6.4–8.2)
RBC # BLD AUTO: 3.8 M/MM3 (ref 4–5.6)
SODIUM SERPL-SCNC: 141 MMOL/L (ref 136–145)
WBC # BLD AUTO: 8.5 K/MM3 (ref 4–10)

## 2019-01-25 RX ADMIN — CEFAZOLIN SODIUM SCH MLS/HR: 2 SOLUTION INTRAVENOUS at 10:38

## 2019-01-25 RX ADMIN — APIXABAN SCH MG: 5 TABLET, FILM COATED ORAL at 23:23

## 2019-01-25 RX ADMIN — LISINOPRIL SCH MG: 20 TABLET ORAL at 10:38

## 2019-01-25 RX ADMIN — SODIUM CHLORIDE, POTASSIUM CHLORIDE, SODIUM LACTATE AND CALCIUM CHLORIDE SCH MLS/HR: 600; 310; 30; 20 INJECTION, SOLUTION INTRAVENOUS at 14:04

## 2019-01-25 RX ADMIN — MORPHINE SULFATE PRN MG: 2 INJECTION, SOLUTION INTRAMUSCULAR; INTRAVENOUS at 10:38

## 2019-01-25 RX ADMIN — APIXABAN SCH MG: 5 TABLET, FILM COATED ORAL at 10:38

## 2019-01-25 RX ADMIN — SODIUM CHLORIDE, POTASSIUM CHLORIDE, SODIUM LACTATE AND CALCIUM CHLORIDE SCH MLS/HR: 600; 310; 30; 20 INJECTION, SOLUTION INTRAVENOUS at 03:14

## 2019-01-25 RX ADMIN — CEFAZOLIN SODIUM SCH MLS/HR: 2 SOLUTION INTRAVENOUS at 03:05

## 2019-01-25 NOTE — PN
Teaching Attending Note


Name of Resident: Eliecer Obrien





ATTENDING PHYSICIAN STATEMENT





I saw and evaluated the patient.


I reviewed the resident's note and discussed the case with the resident.


I agree with the resident's findings and plan as documented.








SUBJECTIVE: Patient is comfortable sitting in chair. He says pain is controlled.








OBJECTIVE:


 Vital Signs











 Period  Temp  Pulse  Resp  BP Sys/Nazario  Pulse Ox


 


 Last 24 Hr  97.4 F-98.5 F  86-96  14-18  116-140/58-81  








HEART: Irregular


LUNGS: Clear


ABDOMEN: Soft, non-tender, non-distended, normal BS


EXTREMITIES: No edema





 Laboratory Results - last 24 hr











  01/24/19 01/24/19 01/24/19





  12:40 14:35 18:55


 


WBC    10.0


 


RBC    4.30


 


Hgb    15.0


 


Hct    42.9


 


MCV    99.9 H


 


MCH    34.9 H


 


MCHC    35.0


 


RDW    13.2


 


Plt Count    128 L


 


MPV    10.0


 


Absolute Neuts (auto)    9.1 H


 


Neutrophils %    90.4 H


 


Lymphocytes %    7.0 L D


 


Monocytes %    2.1 L


 


Eosinophils %    0.2


 


Basophils %    0.3


 


Nucleated RBC %    0


 


Sodium   


 


Potassium   


 


Chloride   


 


Carbon Dioxide   


 


Anion Gap   


 


BUN   


 


Creatinine   


 


Creat Clearance w eGFR   


 


Random Glucose   


 


Calcium   


 


Total Bilirubin   


 


AST   


 


ALT   


 


Alkaline Phosphatase   


 


Total Protein   


 


Albumin   


 


Blood Type  A POSITIVE  A POSITIVE 


 


Antibody Screen  Negative  














  01/24/19 01/25/19 01/25/19





  18:55 06:00 06:00


 


WBC   8.5 


 


RBC   3.80 L 


 


Hgb   13.1 


 


Hct   37.5 


 


MCV   98.6 H 


 


MCH   34.4 H 


 


MCHC   34.9 


 


RDW   13.5 


 


Plt Count   126 L 


 


MPV   9.4 


 


Absolute Neuts (auto)   


 


Neutrophils %   


 


Lymphocytes %   


 


Monocytes %   


 


Eosinophils %   


 


Basophils %   


 


Nucleated RBC %   


 


Sodium  144   141


 


Potassium  4.1   4.2


 


Chloride  107   106


 


Carbon Dioxide  28   26


 


Anion Gap  9   9


 


BUN  16   16


 


Creatinine  1.0   1.1


 


Creat Clearance w eGFR  > 60   > 60


 


Random Glucose  162 H   179 H


 


Calcium  7.9 L   7.8 L


 


Total Bilirubin    1.0


 


AST    16


 


ALT    20


 


Alkaline Phosphatase    44 L


 


Total Protein    5.3 L


 


Albumin    2.9 L


 


Blood Type   


 


Antibody Screen   








 Current Medications











Generic Name Dose Route Start Last Admin





  Trade Name Freq  PRN Reason Stop Dose Admin


 


Apixaban  5 mg  01/25/19 10:00  01/25/19 10:38





  Eliquis -  PO   5 mg





  BID RUCHI   Administration





     





     





     





     


 


Lactated Ringer's  1,000 mls @ 83 mls/hr  01/24/19 19:08  01/25/19 03:14





  Lactated Ringers Solution  IV   83 mls/hr





  ASDIR RUCHI   Administration





     





     





     





     


 


Lisinopril  40 mg  01/25/19 10:00  01/25/19 10:38





  Prinivil  PO   40 mg





  DAILY RUCHI   Administration





     





     





     





     


 


Morphine Sulfate  2 mg  01/24/19 19:08  01/25/19 10:38





  Morphine Sulfate  IVPUSH   2 mg





  Q6H PRN   Administration





  PAIN LEVEL 7 - 10   





     





     





     


 


Tamsulosin HCl  0.4 mg  01/24/19 22:00  





  Flomax -  PO   





  HS RUCHI   





     





     





     





     














ASSESSMENT AND PLAN:


This is an 85 year old man with a history of atrial fib, HTN who presented to 

the ED with left hip pain after a fall. 





1. Intertrochanteric left femur fracture


   - s/p cephalomedullary nail fixation 1/24


   - Pain control


   - Physical therapy - will need subacute rehab


2. Permanent atrial fibrillation


   - Rate controlled


   - Eliquis resumed


3. HTN


   - Continue lisinopril

## 2019-01-25 NOTE — PN
Progress Note (short form)





- Note


Progress Note: 





 ORTHOPEDIC SURGERY PROGRESS NOTE


                             Department of Orthopedic Surgery





SUBJECTIVE





No acute events overnight. No complaints currently. Denies chest pain, 

shortness of breath, or calf pain. No nausea or vomiting. Tolerating oral 

intake. Pain control difficult overnight, but improving. 





PHYSICAL EXAMINATION


General: Alert, oriented, cooperative and no distress.





Left Lower Extremity: Dressing clean/dry/intact; Skin intact, no lesions, 

rashes or ulcers noted. Muscle mass equal and symmetric to contralateral side. 

No atrophy noted. No masses or effusions noted. No tenderness to palpation. 

Full passive and active ROM of the knee and ankle, free from pain. EHL/TA/GS 

motor intact; SILT distally; 2+ DP pulses; Cap refill brisk.





DVT Exam: No evidence of DVT seen on physical exam; No cords or calf tenderness

; No significant calf/ankle edema.





Intake & Output











 01/23/19 01/24/19 01/25/19





 23:59 23:59 23:59


 


Intake Total  1730 


 


Output Total  50 200


 


Balance  1680 -200


 


Intake:   


 


  IV  1680 


 


    Lactated Ringers Solution  830 





    1,000 ml @ 83 mls/hr IV   





    ASDIR RUCHI Rx#:[id number]   


 


  IVPB  50 


 


  Oral  0 


 


Output:   


 


  Urine   200


 


    Void   200


 


  Estimated Blood Loss  50 


 


Other:   


 


  Voiding Method Urinal Urinal 


 


  # Unmeasured Voids   


 


    Void  0 


 


  Bowel Movement  No 


 


  # Bowel Movements  0 


 


  Weight 195 lb 195 lb 


 


  Height 5 ft 7 in 5 ft 10 in 


 


  Body Mass Index (BMI) 30.5 27.9 


 


  Weight Measurement Method  Estimated by Staff 


 


  Weight Measurement Method Est/Stated by Patient  








Active Medications











Generic Name Dose Route Start Last Admin





  Trade Name Freq  PRN Reason Stop Dose Admin


 


Enoxaparin Sodium  40 mg  01/25/19 10:00  





  Lovenox -  SQ   





  DAILY RUCHI   





     





     





     





     


 


Cefazolin Sodium/Dextrose  2 gm in 50 mls @ 100 mls/hr  01/25/19 02:00  01/25/ 19 03:05





  Ancef 2 Gm Premixed Ivpb -  IVPB  01/25/19 10:29  100 mls/hr





  Q8H-IV RUCHI   Administration





     





     





     





     


 


Lactated Ringer's  1,000 mls @ 83 mls/hr  01/24/19 19:08  01/25/19 03:14





  Lactated Ringers Solution  IV   83 mls/hr





  ASDIR RUCHI   Administration





     





     





     





     


 


Lisinopril  40 mg  01/25/19 10:00  





  Prinivil  PO   





  DAILY RUCHI   





     





     





     





     


 


Morphine Sulfate  2 mg  01/24/19 19:08  





  Morphine Sulfate  IVPUSH   





  Q6H PRN   





  PAIN LEVEL 7 - 10   





     





     





     


 


Tamsulosin HCl  0.4 mg  01/24/19 22:00  





  Flomax -  PO   





  HS RUCHI   





     





     





     





     








Vital Signs (last)











Temp Pulse Resp BP Pulse Ox


 


 97.4 F L  86   18   116/81   100 


 


 01/25/19 05:00  01/25/19 05:00  01/25/19 05:00  01/25/19 05:00  01/24/19 21:00








Laboratory (coagulation)











PT with INR  15.00 SEC (9.7-13.0)  H  01/23/19  22:35    








Laboratory





 01/25/19 06:00 





 01/25/19 06:00 











ASSESSMENT AND PLAN





Mr. Flores is an 85 year old male s/p a Left Hip Cephalomedullary nail, POD 1





- Pain control: Transition to oral pain medications, minimize narcotic use


- DVT prophylaxis


- Ice/Elevation


- Elevate HOB, encourage oral intake


- Appreciate medical/cardio management (Nutrition optimization, decubitus 

precautions heel/sacrum)


- PT daily; WBAT LLE


- D/C Staples POD 14


- Patient can follow up in my office in 10 days after discharge from hospital. 

Call [phone] for appointment.


- Dispo planning

## 2019-01-25 NOTE — PN
Progress Note (short form)





- Note


Progress Note: 





Anesthesia POD#1


S/P Left Hip Gamma Nail under GA


VSS,eating and drinking fine,no N/V,no pain





Marion Correa MD.

## 2019-01-25 NOTE — PN
Physical Exam: 


SUBJECTIVE: Patient seen and examined at bedside this morning. He is POD#1 s/p 

left hip cephalomedullary nail procedure. Overnight he has remained afebrile. 

Pain controlled with IV morphine. Patient is tolerating regulr diet without 

abdominal pain, nausea, vomiting. He has passed flatus, however has not yet had 

a bowel movement. 





OBJECTIVE:





 Vital Signs











 Period  Temp  Pulse  Resp  BP Sys/Nazario  Pulse Ox


 


 Last 24 Hr  97.4 F-98.5 F  86-96  14-18  116-140/58-81  








GENERAL: The patient is awake, alert, and fully oriented, in no acute distress.


HEAD: Normocephalic, atraumatic


EYES: PERRL, extraocular movements intact, sclera anicteric. 


ENT: Oropharynx clear without exudates, moist mucous membranes.


NECK: Supple without lymphadenopathy, or JVD


LUNGS: Breath sounds equal, clear to auscultation bilaterally, no wheezes, no 

crackles, no accessory muscle use. 


HEART: Regular rate and rhythm, S1, S2 without murmur, rub or gallop.


ABDOMEN: Soft, nontender, nondistended, normoactive bowel sounds, no guarding, 

no rebound, no hepatosplenomegaly, no masses.


EXTREMITIES: 2+ radial and dorsalis pedis pulses bilateraly. Warm, well-

perfused. No lower extremity edema bilaterally. Sensation intact bilateral 

upper and lower extremities. 


NEUROLOGICAL: Cranial nerves II through XII grossly intact. Normal speech, gait 

not observed due to acute fracture.


PSYCH: Normal mood, normal affect.


SKIN: Warm, dry. Left hip surgical site bandaged clean, dry, nondraining. 





 Laboratory Results - last 24 hr











  01/23/19 01/24/19 01/24/19





  22:35 12:40 14:35


 


WBC   


 


RBC   


 


Hgb   


 


Hct   


 


MCV   


 


MCH   


 


MCHC   


 


RDW   


 


Plt Count   


 


MPV   


 


Absolute Neuts (auto)   


 


Neutrophils %   


 


Lymphocytes %   


 


Monocytes %   


 


Eosinophils %   


 


Basophils %   


 


Nucleated RBC %   


 


Sodium   


 


Potassium   


 


Chloride   


 


Carbon Dioxide   


 


Anion Gap   


 


BUN   


 


Creatinine   


 


Creat Clearance w eGFR   


 


Random Glucose   


 


Calcium   


 


Total Bilirubin   


 


AST   


 


ALT   


 


Alkaline Phosphatase   


 


Total Protein   


 


Albumin   


 


Blood Type  Cancelled  A POSITIVE  A POSITIVE


 


Antibody Screen  Cancelled  Negative 














  01/24/19 01/24/19 01/25/19





  18:55 18:55 06:00


 


WBC  10.0   8.5


 


RBC  4.30   3.80 L


 


Hgb  15.0   13.1


 


Hct  42.9   37.5


 


MCV  99.9 H   98.6 H


 


MCH  34.9 H   34.4 H


 


MCHC  35.0   34.9


 


RDW  13.2   13.5


 


Plt Count  128 L   126 L


 


MPV  10.0   9.4


 


Absolute Neuts (auto)  9.1 H  


 


Neutrophils %  90.4 H  


 


Lymphocytes %  7.0 L D  


 


Monocytes %  2.1 L  


 


Eosinophils %  0.2  


 


Basophils %  0.3  


 


Nucleated RBC %  0  


 


Sodium   144 


 


Potassium   4.1 


 


Chloride   107 


 


Carbon Dioxide   28 


 


Anion Gap   9 


 


BUN   16 


 


Creatinine   1.0 


 


Creat Clearance w eGFR   > 60 


 


Random Glucose   162 H 


 


Calcium   7.9 L 


 


Total Bilirubin   


 


AST   


 


ALT   


 


Alkaline Phosphatase   


 


Total Protein   


 


Albumin   


 


Blood Type   


 


Antibody Screen   














  01/25/19





  06:00


 


WBC 


 


RBC 


 


Hgb 


 


Hct 


 


MCV 


 


MCH 


 


MCHC 


 


RDW 


 


Plt Count 


 


MPV 


 


Absolute Neuts (auto) 


 


Neutrophils % 


 


Lymphocytes % 


 


Monocytes % 


 


Eosinophils % 


 


Basophils % 


 


Nucleated RBC % 


 


Sodium  141


 


Potassium  4.2


 


Chloride  106


 


Carbon Dioxide  26


 


Anion Gap  9


 


BUN  16


 


Creatinine  1.1


 


Creat Clearance w eGFR  > 60


 


Random Glucose  179 H


 


Calcium  7.8 L


 


Total Bilirubin  1.0


 


AST  16


 


ALT  20


 


Alkaline Phosphatase  44 L


 


Total Protein  5.3 L


 


Albumin  2.9 L


 


Blood Type 


 


Antibody Screen 








Active Medications











Generic Name Dose Route Start Last Admin





  Trade Name Freq  PRN Reason Stop Dose Admin


 


Apixaban  5 mg  01/25/19 10:00  01/25/19 10:38





  Eliquis -  PO   5 mg





  BID RUCHI   Administration





     





     





     





     


 


Lactated Ringer's  1,000 mls @ 83 mls/hr  01/24/19 19:08  01/25/19 03:14





  Lactated Ringers Solution  IV   83 mls/hr





  ASDIR RUCHI   Administration





     





     





     





     


 


Lisinopril  40 mg  01/25/19 10:00  01/25/19 10:38





  Prinivil  PO   40 mg





  DAILY RUCHI   Administration





     





     





     





     


 


Morphine Sulfate  2 mg  01/24/19 19:08  01/25/19 10:38





  Morphine Sulfate  IVPUSH   2 mg





  Q6H PRN   Administration





  PAIN LEVEL 7 - 10   





     





     





     


 


Tamsulosin HCl  0.4 mg  01/24/19 22:00  





  Flomax -  PO   





  HS RUCHI   





     





     





     





     








IMAGING


 -Radiograph left hip, pelvis shows comminuated displaced left 

intertrochanteric fracture with avulsion of greater and lesser trochanter. 


 -CT pelvis confirms left comminuated intertrochanteric fracture, prostatic 

enlargement. 





ASSESSMENT/PLAN:


Patient is an 85 year old male with history of Afib (on Eliquis) hypertension, 

presents after mechanical fall. 





Left intratrochanteric femoral fracture s/p mechanical fall


 -Patient is POD#1 s/p left hip cephalomedullary nail procedure


 -IV Lactated Ringer's at 83mL/ hour 


 -Pain control with Morphine 2mg IV Q6H PRN


 -Physical therapy evaluation appreciated. Patient will require rehab placement 

after hospital discharge.





Afib


 -Renistate home Eliquis 5mg PO BID


 -Patient is not on rate control medication. Monitor vital signs closely





Hypertension


 -Lisinopril 40mg PO daily





Benign prostatic hyperplasia


 -Noted incidentally upon CT scan


 -Flomax 0.4mg PO daily





FEN


 -No IV fluids indicated. Encourage judicious oral hydration. 


 -Follow BMP


 -Regular diet





Prophylaxis


 -Patient is on Eliquis 5mg PO BID





Disposition


 -Continue care in medical surgical floor. Patient requires rehab placement 

after hospital discharge.





Visit type





- Emergency Visit


Emergency Visit: Yes


ED Registration Date: 01/23/19


Care time: The patient presented to the Emergency Department on the above date 

and was hospitalized for further evaluation of their emergent condition.





- New Patient


This patient is new to me today: No





- Critical Care


Critical Care patient: No





- Discharge Referral


Referred to [facility] No

## 2019-01-26 LAB
ANION GAP SERPL CALC-SCNC: 7 MMOL/L (ref 8–16)
BUN SERPL-MCNC: 20 MG/DL (ref 7–18)
CALCIUM SERPL-MCNC: 7.9 MG/DL (ref 8.5–10.1)
CHLORIDE SERPL-SCNC: 106 MMOL/L (ref 98–107)
CO2 SERPL-SCNC: 28 MMOL/L (ref 21–32)
CREAT SERPL-MCNC: 1 MG/DL (ref 0.55–1.3)
DEPRECATED RDW RBC AUTO: 13.4 % (ref 11.9–15.9)
GLUCOSE SERPL-MCNC: 119 MG/DL (ref 74–106)
HCT VFR BLD CALC: 36.4 % (ref 35.4–49)
HGB BLD-MCNC: 12.6 GM/DL (ref 11.7–16.9)
MCH RBC QN AUTO: 34.2 PG (ref 25.7–33.7)
MCHC RBC AUTO-ENTMCNC: 34.7 G/DL (ref 32–35.9)
MCV RBC: 98.7 FL (ref 80–96)
PLATELET # BLD AUTO: 127 K/MM3 (ref 134–434)
PMV BLD: 9.6 FL (ref 7.5–11.1)
POTASSIUM SERPLBLD-SCNC: 4 MMOL/L (ref 3.5–5.1)
RBC # BLD AUTO: 3.68 M/MM3 (ref 4–5.6)
SODIUM SERPL-SCNC: 142 MMOL/L (ref 136–145)
WBC # BLD AUTO: 9.4 K/MM3 (ref 4–10)

## 2019-01-26 RX ADMIN — LISINOPRIL SCH MG: 20 TABLET ORAL at 09:18

## 2019-01-26 RX ADMIN — APIXABAN SCH MG: 5 TABLET, FILM COATED ORAL at 09:18

## 2019-01-26 RX ADMIN — TAMSULOSIN HYDROCHLORIDE SCH MG: 0.4 CAPSULE ORAL at 21:56

## 2019-01-26 RX ADMIN — APIXABAN SCH MG: 5 TABLET, FILM COATED ORAL at 21:55

## 2019-01-26 RX ADMIN — MORPHINE SULFATE PRN MG: 2 INJECTION, SOLUTION INTRAMUSCULAR; INTRAVENOUS at 09:18

## 2019-01-26 NOTE — PN
Progress Note (short form)





- Note


Progress Note: 





 ORTHOPEDIC SURGERY PROGRESS NOTE


                                  Department of Orthopedic Surgery





SUBJECTIVE





No acute events overnight. No complaints currently. Denies chest pain, 

shortness of breath, or calf pain. No nausea or vomiting. Tolerating oral 

intake. Pain control improved. Sitting in the chair comfortably this morning.





PHYSICAL EXAMINATION


General: Alert, oriented, cooperative and no distress.





Left Lower Extremity: Dressing clean/dry/intact; Skin intact, no lesions, 

rashes or ulcers noted. Muscle mass equal and symmetric to contralateral side. 

No atrophy noted. No masses or effusions noted. No tenderness to palpation. 

Full passive and active ROM of the knee and ankle, free from pain. EHL/TA/GS 

motor intact; SILT distally; 2+ DP pulses; Cap refill brisk.





DVT Exam: No evidence of DVT seen on physical exam; No cords or calf tenderness

; No significant calf/ankle edema.





Intake & Output











 01/24/19 01/25/19 01/26/19





 23:59 23:59 23:59


 


Intake Total 1730 1210 


 


Output Total 50 500 300


 


Balance 1680 710 -300


 


Intake:   


 


  IV 1680 160 


 


    Lactated Ringers Solution 830 160 





    1,000 ml @ 83 mls/hr IV   





    ASDIR RUCHI Rx#:[id number]   


 


  IVPB 50  


 


  Oral 0 1050 


 


Output:   


 


  Urine  500 300


 


    Void  500 300


 


  Estimated Blood Loss 50  


 


Other:   


 


  Voiding Method Urinal Urinal Urinal


 


  # Unmeasured Voids   


 


    Void 0 1 


 


  Bowel Movement No No No


 


  # Bowel Movements 0  


 


  Weight 195 lb  


 


  Height 5 ft 10 in  


 


  Body Mass Index (BMI) 27.9  


 


  Weight Measurement Method Estimated by Staff  








Active Medications











Generic Name Dose Route Start Last Admin





  Trade Name Freq  PRN Reason Stop Dose Admin


 


Apixaban  5 mg  01/25/19 10:00  01/26/19 09:18





  Eliquis -  PO   5 mg





  BID RUCHI   Administration





     





     





     





     


 


Lactated Ringer's  1,000 mls @ 83 mls/hr  01/24/19 19:08  01/25/19 14:04





  Lactated Ringers Solution  IV   83 mls/hr





  ASDIR RUCHI   Administration





     





     





     





     


 


Lisinopril  40 mg  01/25/19 10:00  01/26/19 09:18





  Prinivil  PO   40 mg





  DAILY RUCHI   Administration





     





     





     





     


 


Morphine Sulfate  2 mg  01/24/19 19:08  01/26/19 09:18





  Morphine Sulfate  IVPUSH   2 mg





  Q6H PRN   Administration





  PAIN LEVEL 7 - 10   





     





     





     


 


Tamsulosin HCl  0.4 mg  01/24/19 22:00  





  Flomax -  PO   





  HS RUCHI   





     





     





     





     








Vital Signs (last)











Temp Pulse Resp BP Pulse Ox


 


 98.0 F   102 H  20   148/68   100 


 


 01/26/19 10:00  01/26/19 10:00  01/26/19 10:00  01/26/19 10:00  01/25/19 21:00








Laboratory (coagulation)











PT with INR  15.00 SEC (9.7-13.0)  H  01/23/19  22:35    








Laboratory





 01/26/19 07:30 





 01/26/19 07:30 














ASSESSMENT AND PLAN





Mr. Flores is an 85 year old male s/p a Left Hip Cephalomedullary nail, POD 2





- Pain control: Transition to oral pain medications, minimize narcotic use


- DVT prophylaxis


- Ice/Elevation


- Elevate HOB, encourage oral intake


- Appreciate medical/cardio management (Nutrition optimization, decubitus 

precautions heel/sacrum)


- PT daily; WBAT LLE


- D/C Staples POD 14


- Patient can follow up in my office in 10 days after discharge from hospital. 

Call [phone] for appointment.


- Dispo planning

## 2019-01-26 NOTE — PN
Physical Exam: 


SUBJECTIVE: Patient seen and examined. He has no complaints. 








OBJECTIVE:





 Vital Signs











 Period  Temp  Pulse  Resp  BP Sys/Nazario  Pulse Ox


 


 Last 24 Hr  97.9 F-98.5 F    18-20  109-148/65-71  100











GENERAL: The patient is awake, alert, and fully oriented, in no acute distress.


LUNGS: Breath sounds equal, clear to auscultation bilaterally, no wheezes, no 

crackles, no accessory muscle use. 


HEART: Irregularly irregular.


ABDOMEN: Soft, nontender, nondistended, normoactive bowel sounds, no guarding, 

no rebound, no hepatosplenomegaly, no masses.


EXTREMITIES: 2+ pulses, warm, well-perfused, no edema. 











 Laboratory Results - last 24 hr











  01/26/19 01/26/19





  07:30 07:30


 


WBC  9.4 


 


RBC  3.68 L 


 


Hgb  12.6 


 


Hct  36.4 


 


MCV  98.7 H 


 


MCH  34.2 H 


 


MCHC  34.7 


 


RDW  13.4 


 


Plt Count  127 L 


 


MPV  9.6 


 


Sodium   142


 


Potassium   4.0


 


Chloride   106


 


Carbon Dioxide   28


 


Anion Gap   7 L


 


BUN   20 H


 


Creatinine   1.0


 


Creat Clearance w eGFR   > 60


 


Random Glucose   119 H


 


Calcium   7.9 L








Active Medications











Generic Name Dose Route Start Last Admin





  Trade Name Freq  PRN Reason Stop Dose Admin


 


Apixaban  5 mg  01/25/19 10:00  01/26/19 09:18





  Eliquis -  PO   5 mg





  BID RUCHI   Administration





     





     





     





     


 


Lactated Ringer's  1,000 mls @ 83 mls/hr  01/24/19 19:08  01/25/19 14:04





  Lactated Ringers Solution  IV   83 mls/hr





  ASDIR RUCHI   Administration





     





     





     





     


 


Lisinopril  40 mg  01/25/19 10:00  01/26/19 09:18





  Prinivil  PO   40 mg





  DAILY RUCHI   Administration





     





     





     





     


 


Morphine Sulfate  2 mg  01/24/19 19:08  01/26/19 09:18





  Morphine Sulfate  IVPUSH   2 mg





  Q6H PRN   Administration





  PAIN LEVEL 7 - 10   





     





     





     


 


Tamsulosin HCl  0.4 mg  01/24/19 22:00  





  Flomax -  PO   





  HS RUCHI   





     





     





     





     











ASSESSMENT/PLAN:


This is an 85 year old man with a history of atrial fib, HTN who presented to 

the ED with left hip pain after a fall. 





1. Intertrochanteric left femur fracture


   - s/p cephalomedullary nail fixation 1/24


   - Pain control


   - Continue physical therapy


2. Permanent atrial fibrillation


   - Rate controlled


   - Continue Eliquis


3. HTN


   - Continue lisinopril


4. DVT prophylaxis


   - On Eliquis


5. Disposition


   - Plan for discharge to subacute rehab





Visit type





- Emergency Visit


Emergency Visit: Yes


ED Registration Date: 01/23/19


Care time: The patient presented to the Emergency Department on the above date 

and was hospitalized for further evaluation of their emergent condition.





- New Patient


This patient is new to me today: No





- Critical Care


Critical Care patient: No





- Discharge Referral


Referred to [facility] No

## 2019-01-27 LAB
ALBUMIN SERPL-MCNC: 2.9 G/DL (ref 3.4–5)
ALP SERPL-CCNC: 44 U/L (ref 45–117)
ALT SERPL-CCNC: 17 U/L (ref 13–61)
ANION GAP SERPL CALC-SCNC: 7 MMOL/L (ref 8–16)
AST SERPL-CCNC: 22 U/L (ref 15–37)
BASOPHILS # BLD: 0.3 % (ref 0–2)
BILIRUB SERPL-MCNC: 1 MG/DL (ref 0.2–1)
BUN SERPL-MCNC: 21 MG/DL (ref 7–18)
CALCIUM SERPL-MCNC: 7.8 MG/DL (ref 8.5–10.1)
CHLORIDE SERPL-SCNC: 105 MMOL/L (ref 98–107)
CO2 SERPL-SCNC: 29 MMOL/L (ref 21–32)
CREAT SERPL-MCNC: 1 MG/DL (ref 0.55–1.3)
DEPRECATED RDW RBC AUTO: 13.1 % (ref 11.9–15.9)
EOSINOPHIL # BLD: 2 % (ref 0–4.5)
GLUCOSE SERPL-MCNC: 142 MG/DL (ref 74–106)
HCT VFR BLD CALC: 37.5 % (ref 35.4–49)
HGB BLD-MCNC: 13.3 GM/DL (ref 11.7–16.9)
LYMPHOCYTES # BLD: 14.2 % (ref 8–40)
MAGNESIUM SERPL-MCNC: 2 MG/DL (ref 1.8–2.4)
MCH RBC QN AUTO: 35.4 PG (ref 25.7–33.7)
MCHC RBC AUTO-ENTMCNC: 35.4 G/DL (ref 32–35.9)
MCV RBC: 100 FL (ref 80–96)
MONOCYTES # BLD AUTO: 6.8 % (ref 3.8–10.2)
NEUTROPHILS # BLD: 76.7 % (ref 42.8–82.8)
PHOSPHATE SERPL-MCNC: 3.6 MG/DL (ref 2.5–4.9)
PLATELET # BLD AUTO: 148 K/MM3 (ref 134–434)
PMV BLD: 9.7 FL (ref 7.5–11.1)
POTASSIUM SERPLBLD-SCNC: 4.1 MMOL/L (ref 3.5–5.1)
PROT SERPL-MCNC: 5.5 G/DL (ref 6.4–8.2)
RBC # BLD AUTO: 3.75 M/MM3 (ref 4–5.6)
SODIUM SERPL-SCNC: 141 MMOL/L (ref 136–145)
WBC # BLD AUTO: 9 K/MM3 (ref 4–10)

## 2019-01-27 RX ADMIN — APIXABAN SCH MG: 5 TABLET, FILM COATED ORAL at 22:16

## 2019-01-27 RX ADMIN — LISINOPRIL SCH MG: 20 TABLET ORAL at 09:14

## 2019-01-27 RX ADMIN — TAMSULOSIN HYDROCHLORIDE SCH MG: 0.4 CAPSULE ORAL at 22:16

## 2019-01-27 RX ADMIN — APIXABAN SCH MG: 5 TABLET, FILM COATED ORAL at 09:14

## 2019-01-27 NOTE — PN
Physical Exam: 


SUBJECTIVE: Patient seen and examined. No acute events overnight. Offers no 

complaints.








OBJECTIVE:





 Vital Signs











 Period  Temp  Pulse  Resp  BP Sys/Nazario  Pulse Ox


 


 Last 24 Hr  97.7 F-98.9 F    18-20  /23-70  95-96














GENERAL: a/o x 3, comfortable, in nad


HEAD: Normocephalic, atraumatic


EYES: PERRL, extraocular movements intact, sclera anicteric. 


ENT: Oropharynx clear without exudates, moist mucous membranes.


NECK: Supple without lymphadenopathy, or JVD


LUNGS: Breath sounds equal, clear to auscultation bilaterally, no wheezes, no 

crackles. 


HEART: Regular rate and rhythm, S1, S2 without murmur, rub or gallop.


ABDOMEN: Soft, nontender, nondistended, normoactive bowel sounds, no guarding, 

no rebound, no hepatosplenomegaly, no masses.


EXTREMITIES: 2+ radial and dorsalis pedis pulses bilateraly. Warm, well-

perfused. No lower extremity edema bilaterally. Sensation intact bilateral 

upper and lower extremities. 


NEUROLOGICAL: Cranial nerves II through XII grossly intact. 


SKIN: Warm, dry. Left hip surgical site bandaged clean, dry, nondraining. 














 Laboratory Results - last 24 hr











  01/27/19 01/27/19





  12:21 12:35


 


WBC   9.0


 


RBC   3.75 L


 


Hgb   13.3


 


Hct   37.5


 


MCV   100.0 H


 


MCH   35.4 H


 


MCHC   35.4


 


RDW   13.1


 


Plt Count   148


 


MPV   9.7


 


Absolute Neuts (auto)   6.9


 


Neutrophils %   76.7


 


Lymphocytes %   14.2  D


 


Monocytes %   6.8  D


 


Eosinophils %   2.0  D


 


Basophils %   0.3


 


Nucleated RBC %   0


 


POC Glucometer  225 








Active Medications











Generic Name Dose Route Start Last Admin





  Trade Name Freq  PRN Reason Stop Dose Admin


 


Apixaban  5 mg  01/25/19 10:00  01/27/19 09:14





  Eliquis -  PO   5 mg





  BID RUCHI   Administration





     





     





     





     


 


Lisinopril  40 mg  01/25/19 10:00  01/27/19 09:14





  Prinivil  PO   40 mg





  DAILY RUCHI   Administration





     





     





     





     


 


Morphine Sulfate  2 mg  01/24/19 19:08  01/26/19 09:18





  Morphine Sulfate  IVPUSH   2 mg





  Q6H PRN   Administration





  PAIN LEVEL 7 - 10   





     





     





     


 


Ondansetron HCl  8 mg  01/27/19 09:32  





  Zofran Injection  IVPB   





  Q6H PRN   





  NAUSEA   





     





     





     


 


Oxycodone HCl  5 mg  01/27/19 09:31  





  Roxicodone -  PO  01/30/19 09:31  





  Q4H PRN   





  PAIN LEVEL 1-5   





     





     





     


 


Tamsulosin HCl  0.4 mg  01/24/19 22:00  01/26/19 21:56





  Flomax -  PO   0.4 mg





  HS RUCHI   Administration





     





     





     





     











ASSESSMENT/PLAN:





Patient is an 85 year old male with history of Afib (on Eliquis) hypertension, 

presents after mechanical fall. 





Left intratrochanteric femoral fracture s/p mechanical fall


 -Patient is POD#3 s/p left hip cephalomedullary nail procedure


 -Pain control with Morphine 2mg IV Q6H PRN


 -Physical therapy evaluation appreciated. Patient will require rehab placement 

after hospital discharge.





Afib


 -cont. home Eliquis 5mg PO BID


 -Patient is not on rate control medication. Monitor vital signs closely





Hypertension


 -Lisinopril 40mg PO daily





Benign prostatic hyperplasia


 -Noted incidentally upon CT scan


 -Flomax 0.4mg PO daily





FEN


 -No IV fluids indicated. Encourage judicious oral hydration. 


 -Follow BMP


 -Regular diet





Prophylaxis


 -Patient is on Eliquis 5mg PO BID





Disposition


 -pending rehab placement. like d/c to [facility] tomorrow morning. 



























































Visit type





- Emergency Visit


Emergency Visit: Yes


ED Registration Date: 01/23/19


Care time: The patient presented to the Emergency Department on the above date 

and was hospitalized for further evaluation of their emergent condition.





- New Patient


This patient is new to me today: Yes


Date on this admission: 01/27/19





- Critical Care


Critical Care patient: No

## 2019-01-27 NOTE — PN
Teaching Attending Note


Name of Resident: Jannet Alegria





ATTENDING PHYSICIAN STATEMENT





I saw and evaluated the patient.


I reviewed the resident's note and discussed the case with the resident.


I agree with the resident's findings and plan as documented.








SUBJECTIVE: Rapid response called when patient was found to be diaphoretic, weak

, and hypotensive. He was placed in bed and given IV fluid with a good 

response. He now has no complaints. 








OBJECTIVE:


 Vital Signs











 Period  Temp  Pulse  Resp  BP Sys/Nazario  Pulse Ox


 


 Last 24 Hr  97.7 F-98.9 F    18-20  /23-74  95-96








HEART: Irregular


LUNGS: Clear


ABDOMEN: Soft, non-tender, non-distended, normal BS


EXTREMITIES: No edema





 Laboratory Results - last 24 hr











  01/27/19 01/27/19 01/27/19





  12:21 12:35 12:35


 


WBC   9.0 


 


RBC   3.75 L 


 


Hgb   13.3 


 


Hct   37.5 


 


MCV   100.0 H 


 


MCH   35.4 H 


 


MCHC   35.4 


 


RDW   13.1 


 


Plt Count   148 


 


MPV   9.7 


 


Absolute Neuts (auto)   6.9 


 


Neutrophils %   76.7 


 


Lymphocytes %   14.2  D 


 


Monocytes %   6.8  D 


 


Eosinophils %   2.0  D 


 


Basophils %   0.3 


 


Nucleated RBC %   0 


 


Sodium    141


 


Potassium    4.1


 


Chloride    105


 


Carbon Dioxide    29


 


Anion Gap    7 L


 


BUN    21 H


 


Creatinine    1.0


 


Creat Clearance w eGFR    > 60


 


POC Glucometer  225  


 


Random Glucose    142 H


 


Calcium    7.8 L


 


Phosphorus    3.6


 


Magnesium    2.0


 


Total Bilirubin    1.0


 


AST    22


 


ALT    17


 


Alkaline Phosphatase    44 L


 


Total Protein    5.5 L


 


Albumin    2.9 L








 Current Medications











Generic Name Dose Route Start Last Admin





  Trade Name Freq  PRN Reason Stop Dose Admin


 


Apixaban  5 mg  01/25/19 10:00  01/27/19 09:14





  Eliquis -  PO   5 mg





  BID RUCHI   Administration





     





     





     





     


 


Lisinopril  40 mg  01/25/19 10:00  01/27/19 09:14





  Prinivil  PO   40 mg





  DAILY RUCHI   Administration





     





     





     





     


 


Morphine Sulfate  2 mg  01/24/19 19:08  01/26/19 09:18





  Morphine Sulfate  IVPUSH   2 mg





  Q6H PRN   Administration





  PAIN LEVEL 7 - 10   





     





     





     


 


Ondansetron HCl  8 mg  01/27/19 09:32  





  Zofran Injection  IVPB   





  Q6H PRN   





  NAUSEA   





     





     





     


 


Oxycodone HCl  5 mg  01/27/19 09:31  





  Roxicodone -  PO  01/30/19 09:31  





  Q4H PRN   





  PAIN LEVEL 1-5   





     





     





     


 


Tamsulosin HCl  0.4 mg  01/24/19 22:00  01/26/19 21:56





  Flomax -  PO   0.4 mg





  HS RUCHI   Administration





     





     





     





     














ASSESSMENT AND PLAN:


This is an 85 year old man with a history of atrial fib, HTN who presented to 

the ED with left hip pain after a fall. 





1. Intertrochanteric left femur fracture


   - s/p cephalomedullary nail fixation 1/24


   - Pain control with oxycodone/morphine as needed


   - DVT prophylaxis with Eliquis


   - Continue physical therapy


2. Near syncope with hypotension, weakness, diaphoresis


   - Likely vasovagal


   - BP improved with IV fluid


3. Permanent atrial fibrillation


   - Rate controlled


   - Continue Eliquis


4. HTN


   - Continue lisinopril


5. DVT prophylaxis


   - On Eliquis


6. Disposition


   - Plan for discharge to subacute rehab

## 2019-01-27 NOTE — RAPID
Physical Examination


Vital Signs: 


 Vital Signs











Temperature  98.9 F   01/27/19 08:46


 


Pulse Rate  103 H  01/27/19 08:46


 


Respiratory Rate  18   01/27/19 08:46


 


Blood Pressure  130/70   01/27/19 08:46


 


O2 Sat by Pulse Oximetry (%)  95   01/27/19 09:00











Labs: 


 CBC, BMP





 01/26/19 07:30 





 01/26/19 07:30 











Rapid Response





- Rapid Response


Assessment: 





Rapid Response called for pt weakness, diaphoresis and hypotension


On arrival pt sitting in chair, diaphoretic, saying hes having a silent 

migraine which he says he gets regularly. Blood pressure noted 48/23 on arrival

, nursing noted prior was 70s/40s.





Pt moved from chair to bed and placed in trendelenburg


blood pressure improved to 105/48





A&O, Mild distress, diaphoretic


Lungs CTA b/l


Heart Irregularly irregular, rate controlled


Abdomen soft nontender


Surgical site examined with no signs of hematoma or bleeding, dressing clean 

dry and in tact








A/P


Possibly vagal response as pt states he had been walking prior to episode


Blood pressure improved in trendelenburg and then stable in supine position


1L NS bolus ordered


CBC, CMP, Mag, Phos, EKG

## 2019-01-27 NOTE — PN
Progress Note, Physician


Chief Complaint: 





hip frx s/p surgery


History of Present Illness: 





sleepy.


denies cp, sob, palpitations, syncope





- Current Medication List


Current Medications: 


Active Medications





Apixaban (Eliquis -)  5 mg PO BID Count includes the [facility]


   Last Admin: 01/27/19 09:14 Dose:  5 mg


Lisinopril (Prinivil)  40 mg PO DAILY Count includes the [facility]


   Last Admin: 01/27/19 09:14 Dose:  40 mg


Morphine Sulfate (Morphine Sulfate)  2 mg IVPUSH Q6H PRN


   PRN Reason: PAIN LEVEL 7 - 10


   Last Admin: 01/26/19 09:18 Dose:  2 mg


Ondansetron HCl (Zofran Injection)  8 mg IVPB Q6H PRN


   PRN Reason: NAUSEA


Oxycodone HCl (Roxicodone -)  5 mg PO Q4H PRN


   PRN Reason: PAIN LEVEL 1-5


   Stop: 01/30/19 09:31


Tamsulosin HCl (Flomax -)  0.4 mg PO HS Count includes the [facility]


   Last Admin: 01/26/19 21:56 Dose:  0.4 mg











- Objective


Vital Signs: 


 Vital Signs











Temperature  98.9 F   01/27/19 08:46


 


Pulse Rate  103 H  01/27/19 08:46


 


Respiratory Rate  18   01/27/19 08:46


 


Blood Pressure  130/70   01/27/19 08:46


 


O2 Sat by Pulse Oximetry (%)  95   01/27/19 09:00











Constitutional: Yes: Well Nourished, No Distress, Calm


Cardiovascular: Yes: Pulse Irregular, S1, S2.  No: Gallop, Murmur


Respiratory: Yes: Regular, CTA Bilaterally.  No: Accessory Muscle Use, Rales, 

Wheezes


Extremities: No: Cold


Edema: No


Neurological: Yes: Alert.  No: Seizure


Psychiatric: No: Agitated


Labs: 


 CBC, BMP





 01/26/19 07:30 





 01/26/19 07:30 





 INR, PTT











INR  1.27  (0.83-1.09)  H  01/23/19  22:35    














Assessment/Plan





cxr: no chf





ecg: afib, rate 86, nl qtc, no ischemic changes








a/p:  85 m hx afib, htn, here s/p fall.





afib:


-rate controlled w/o meds--observe


-continue eliquis





htn:


-bp controlled


-same meds





fall, hip fx:


-per ortho

## 2019-01-27 NOTE — PN
Progress Note (short form)





- Note


Progress Note: 





 ORTHOPEDIC SURGERY PROGRESS NOTE


                                  Department of Orthopedic Surgery





SUBJECTIVE





No acute events overnight. No complaints currently. Denies chest pain, 

shortness of breath, or calf pain. No nausea or vomiting. Tolerating oral 

intake. Pain control improved. Sitting in bed comfortably this morning.





PHYSICAL EXAMINATION


General: Alert, oriented, cooperative and no distress.





Left Lower Extremity: Dressing clean/dry/intact; No Drainage. Skin intact, no 

lesions, rashes or ulcers noted. Muscle mass equal and symmetric to 

contralateral side. No atrophy noted. No masses or effusions noted. No 

tenderness to palpation. Full passive and active ROM of the knee and ankle, 

free from pain. EHL/TA/GS motor intact; SILT distally; 2+ DP pulses; Cap refill 

brisk.





DVT Exam: No evidence of DVT seen on physical exam; No cords or calf tenderness

; No significant calf/ankle edema.





Intake & Output











 01/25/19 01/26/19 01/27/19





 23:59 23:59 23:59


 


Intake Total 1210 815 


 


Output Total 500 1700 


 


Balance 710 -885 


 


Intake:   


 


   415 


 


    Lactated Ringers Solution 160 415 





    1,000 ml @ 83 mls/hr IV   





    ASDIR RUCHI Rx#:[id number]   


 


  Oral 1050 400 


 


Output:   


 


  Urine 500 1700 


 


    Void 500 1700 


 


Other:   


 


  Voiding Method Urinal Urinal 


 


  # Unmeasured Voids   


 


    Void 1 2 


 


  Bowel Movement No No No


 


  # Bowel Movements  0 








Active Medications











Generic Name Dose Route Start Last Admin





  Trade Name Freq  PRN Reason Stop Dose Admin


 


Apixaban  5 mg  01/25/19 10:00  01/27/19 09:14





  Eliquis -  PO   5 mg





  BID RUCHI   Administration





     





     





     





     


 


Lisinopril  40 mg  01/25/19 10:00  01/27/19 09:14





  Prinivil  PO   40 mg





  DAILY RUCHI   Administration





     





     





     





     


 


Morphine Sulfate  2 mg  01/24/19 19:08  01/26/19 09:18





  Morphine Sulfate  IVPUSH   2 mg





  Q6H PRN   Administration





  PAIN LEVEL 7 - 10   





     





     





     


 


Ondansetron HCl  8 mg  01/27/19 09:32  





  Zofran Injection  IVPB   





  Q6H PRN   





  NAUSEA   





     





     





     


 


Oxycodone HCl  5 mg  01/27/19 09:31  





  Roxicodone -  PO  01/30/19 09:31  





  Q4H PRN   





  PAIN LEVEL 1-5   





     





     





     


 


Tamsulosin HCl  0.4 mg  01/24/19 22:00  01/26/19 21:56





  Flomax -  PO   0.4 mg





  HS RUCHI   Administration





     





     





     





     








Vital Signs (last)











Temp Pulse Resp BP Pulse Ox


 


 98.9 F   103 H  18   130/70   96 


 


 01/27/19 08:46  01/27/19 08:46  01/27/19 08:46  01/27/19 08:46  01/26/19 22:00








Laboratory (coagulation)











PT with INR  15.00 SEC (9.7-13.0)  H  01/23/19  22:35    








Laboratory





 01/26/19 07:30 





 01/26/19 07:30 














ASSESSMENT AND PLAN





Mr. Flores is an 85 year old male s/p a Left Hip Cephalomedullary nail, POD 3





- Pain control: Transition to oral pain medications, minimize narcotic use


- DVT prophylaxis


- Ice/Elevation


- Elevate HOB, encourage oral intake


- Appreciate medical/cardio management (Nutrition optimization, decubitus 

precautions heel/sacrum)


- PT daily; WBAT LLE


- Dressing change today


- D/C Staples POD 14


- Patient can follow up in my office in 10 days after discharge from hospital. 

Call [phone] for appointment.


- Dispo planning

## 2019-01-28 VITALS — TEMPERATURE: 98.5 F | SYSTOLIC BLOOD PRESSURE: 122 MMHG | DIASTOLIC BLOOD PRESSURE: 71 MMHG | HEART RATE: 101 BPM

## 2019-01-28 LAB
ALBUMIN SERPL-MCNC: 3 G/DL (ref 3.4–5)
ALP SERPL-CCNC: 50 U/L (ref 45–117)
ALT SERPL-CCNC: 20 U/L (ref 13–61)
ANION GAP SERPL CALC-SCNC: 6 MMOL/L (ref 8–16)
AST SERPL-CCNC: 22 U/L (ref 15–37)
BILIRUB SERPL-MCNC: 1.4 MG/DL (ref 0.2–1)
BUN SERPL-MCNC: 20 MG/DL (ref 7–18)
CALCIUM SERPL-MCNC: 8 MG/DL (ref 8.5–10.1)
CHLORIDE SERPL-SCNC: 104 MMOL/L (ref 98–107)
CO2 SERPL-SCNC: 27 MMOL/L (ref 21–32)
CREAT SERPL-MCNC: 0.9 MG/DL (ref 0.55–1.3)
DEPRECATED RDW RBC AUTO: 13 % (ref 11.9–15.9)
GLUCOSE SERPL-MCNC: 189 MG/DL (ref 74–106)
HCT VFR BLD CALC: 39.7 % (ref 35.4–49)
HGB BLD-MCNC: 13.9 GM/DL (ref 11.7–16.9)
MCH RBC QN AUTO: 34.6 PG (ref 25.7–33.7)
MCHC RBC AUTO-ENTMCNC: 35 G/DL (ref 32–35.9)
MCV RBC: 98.8 FL (ref 80–96)
PLATELET # BLD AUTO: 154 K/MM3 (ref 134–434)
PMV BLD: 9.3 FL (ref 7.5–11.1)
POTASSIUM SERPLBLD-SCNC: 3.9 MMOL/L (ref 3.5–5.1)
PROT SERPL-MCNC: 5.9 G/DL (ref 6.4–8.2)
RBC # BLD AUTO: 4.02 M/MM3 (ref 4–5.6)
SODIUM SERPL-SCNC: 138 MMOL/L (ref 136–145)
WBC # BLD AUTO: 7 K/MM3 (ref 4–10)

## 2019-01-28 RX ADMIN — LISINOPRIL SCH MG: 20 TABLET ORAL at 09:43

## 2019-01-28 RX ADMIN — APIXABAN SCH MG: 5 TABLET, FILM COATED ORAL at 09:43

## 2019-01-28 NOTE — PN
Progress Note (short form)





- Note


Progress Note: 





 ORTHOPEDIC SURGERY PROGRESS NOTE


                                  Department of Orthopedic Surgery





SUBJECTIVE





Had an episode of near-syncope yesterday, rapid response was called and medical 

team evaluated him, no current issues or complaints. Denies chest pain, 

shortness of breath, or calf pain. No nausea or vomiting. Tolerating oral 

intake. Pain control improved. Sitting in bed comfortably this morning.





PHYSICAL EXAMINATION


General: Alert, oriented, cooperative and no distress.





Left Lower Extremity: Dressing with serosanguinous drainage at the proximal 

wound, currently no drainage - Dressing changed to aquacell dressing today. 

Skin intact, no lesions, rashes or ulcers noted. Muscle mass equal and 

symmetric to contralateral side. No atrophy noted. No masses or effusions 

noted. No tenderness to palpation. Full passive and active ROM of the knee and 

ankle, free from pain. EHL/TA/GS motor intact; SILT distally; 2+ DP pulses; Cap 

refill brisk.





DVT Exam: No evidence of DVT seen on physical exam; No cords or calf tenderness

; No significant calf/ankle edema.





Intake & Output











 01/26/19 01/27/19 01/28/19





 23:59 23:59 23:59


 


Intake Total 815 1400 275


 


Output Total 1700 1200 300


 


Balance -885 200 -25


 


Intake:   


 


   1000 


 


    Lactated Ringers Solution 415  





    1,000 ml @ 83 mls/hr IV   





    ASDIR RUCHI Rx#:[id number]   


 


    Normal Saline - 1,000 ml  1000 





    @ 1000 mls/hr IV ASDIR   





    STA Rx#:[id number]   


 


  Oral 400 400 275


 


Output:   


 


  Urine 1700 1200 300


 


    Void 1700 1200 300


 


Other:   


 


  Voiding Method Urinal Urinal Urinal


 


  # Unmeasured Voids   


 


    Void 2  


 


  Bowel Movement No No No


 


  # Bowel Movements 0 0 








Active Medications











Generic Name Dose Route Start Last Admin





  Trade Name Freq  PRN Reason Stop Dose Admin


 


Apixaban  5 mg  01/25/19 10:00  01/28/19 09:43





  Eliquis -  PO   5 mg





  BID RUCHI   Administration





     





     





     





     


 


Lisinopril  40 mg  01/25/19 10:00  01/28/19 09:43





  Prinivil  PO   40 mg





  DAILY RUCHI   Administration





     





     





     





     


 


Morphine Sulfate  2 mg  01/24/19 19:08  01/26/19 09:18





  Morphine Sulfate  IVPUSH   2 mg





  Q6H PRN   Administration





  PAIN LEVEL 7 - 10   





     





     





     


 


Ondansetron HCl  8 mg  01/27/19 09:32  





  Zofran Injection  IVPB   





  Q6H PRN   





  NAUSEA   





     





     





     


 


Oxycodone HCl  5 mg  01/27/19 09:31  





  Roxicodone -  PO  01/30/19 09:31  





  Q4H PRN   





  PAIN LEVEL 1-5   





     





     





     


 


Tamsulosin HCl  0.4 mg  01/24/19 22:00  01/27/19 22:16





  Flomax -  PO   0.4 mg





  HS RUCHI   Administration





     





     





     





     








Vital Signs (last)











Temp Pulse Resp BP Pulse Ox


 


 98.1 F   100 H  20   120/53 L  95 


 


 01/28/19 08:57  01/28/19 08:57  01/28/19 08:57  01/28/19 08:57  01/27/19 22:00








Laboratory (coagulation)











PT with INR  15.00 SEC (9.7-13.0)  H  01/23/19  22:35    








Laboratory





 01/28/19 07:41 





 01/28/19 07:41 














ASSESSMENT AND PLAN





Mr. Flores is an 85 year old male s/p a Left Hip Cephalomedullary nail, POD 4





- Pain control: Transition to oral pain medications, minimize narcotic use


- DVT prophylaxis


- Ice/Elevation


- Elevate HOB, encourage oral intake


- Appreciate medical/cardio management (Nutrition optimization, decubitus 

precautions heel/sacrum)


- PT daily; WBAT LLE


- Dressing changed today to aquacell dressing; Change dressing again in 1 week 

from today.


- D/C Staples POD 14


- Patient can follow up in my office in 10 days after discharge from hospital. 

Call [phone] for appointment.


- Dispo planning

## 2019-01-28 NOTE — EKG
Test Reason : 

Blood Pressure : ***/*** mmHG

Vent. Rate : 086 BPM     Atrial Rate : 080 BPM

   P-R Int : 000 ms          QRS Dur : 078 ms

    QT Int : 368 ms       P-R-T Axes : 000 001 -06 degrees

   QTc Int : 440 ms

 

ATRIAL FIBRILLATION WITH PREMATURE VENTRICULAR OR ABERRANTLY CONDUCTED

COMPLEXES AND PREMATURE ATRIAL COMPLEXES

WHEN COMPARED WITH ECG OF 23-JAN-2019 23:39,

NO SIGNIFICANT CHANGE WAS FOUND

Confirmed by TRISTEN SAMS, SANJAY ([id number]) on 1/28/2019 10:58:44 AM

 

Referred By: NAHUM LOCO           Confirmed By:SANJAY RAMON MD

## 2019-01-28 NOTE — DS
Physical Exam: 


SUBJECTIVE: Patient seen and examined at bedside. He is POD#4 s/p left hip 

cephalomedullary nail procedure. Denies acute complaints today. He was 

hypotensive yesterday, however vital signs remain stable after IV fluid bolus. 

Patient is not orthostatic. He endorses pain is well controlled, and denies 

sibjective fevers or chills. Patient and daughter at bedside in agreement for 

discharge today to [facility] for rehab. 





OBJECTIVE:





 Vital Signs











 Period  Temp  Pulse  Resp  BP Sys/Nazario  Pulse Ox


 


 Last 24 Hr  97.4 F-98.5 F    18-20  /53-74  95








PHYSICAL EXAM





GENERAL: The patient is awake, alert, and fully oriented, in no acute distress.


HEAD: Normocephalic, atraumatic


EYES: PERRL, extraocular movements intact, sclera anicteric. 


ENT: Oropharynx clear without exudates, moist mucous membranes.


NECK: Supple without lymphadenopathy, or JVD


LUNGS: Breath sounds equal, clear to auscultation bilaterally, no wheezes, no 

crackles, no accessory muscle use. 


HEART: Regular rate and rhythm, S1, S2 without murmur, rub or gallop.


ABDOMEN: Soft, nontender, nondistended, normoactive bowel sounds, no guarding, 

no rebound, no hepatosplenomegaly, no masses.


EXTREMITIES: 2+ radial and dorsalis pedis pulses bilateraly. Warm, well-

perfused. No lower extremity edema bilaterally. Sensation intact bilateral 

upper and lower extremities. 


NEUROLOGICAL: Cranial nerves II through XII grossly intact. Normal speech, gait 

not observed due to acute fracture.


PSYCH: Normal mood, normal affect.


SKIN: Warm, dry. Left hip surgical site bandaged clean, dry, nondraining. 





LABS


 Laboratory Results - last 24 hr











  01/27/19 01/28/19 01/28/19





  12:35 07:41 07:41


 


WBC   7.0 


 


RBC   4.02 


 


Hgb   13.9 


 


Hct   39.7 


 


MCV   98.8 H 


 


MCH   34.6 H 


 


MCHC   35.0 


 


RDW   13.0 


 


Plt Count   154 


 


MPV   9.3 


 


Sodium  141   138


 


Potassium  4.1   3.9


 


Chloride  105   104


 


Carbon Dioxide  29   27


 


Anion Gap  7 L   6 L


 


BUN  21 H   20 H


 


Creatinine  1.0   0.9


 


Creat Clearance w eGFR  > 60   > 60


 


Random Glucose  142 H   189 H


 


Calcium  7.8 L   8.0 L


 


Phosphorus  3.6  


 


Magnesium  2.0  


 


Total Bilirubin  1.0   1.4 H


 


AST  22   22


 


ALT  17   20


 


Alkaline Phosphatase  44 L   50


 


Total Protein  5.5 L   5.9 L


 


Albumin  2.9 L   3.0 L











HOSPITAL COURSE:





Date of Admission:01/23/19





Date of Discharge: 01/28/19





Patient is an 85 year old male with history of Afib (on Eliquis) hypertension, 

presents after mechanical fall. Radiograph left hip, pelvis showed comminuated 

displaced left intertrochanteric fracture with avulsion of greater and lesser 

trochanter. CT pelvis confirmed left comminuated intertrochanteric fracture, 

prostatic enlargement. He was evaluated by orthopedic surgery and underwent 

left hip cephalomedullary nail procedure. Home Eliquis, Lisinopril and 

Tamsulosin reinstated. Patient tolerated procedure well, and was evaluated by 

physical therapy who discussed outpatient rehab. There was a rapid response as 

patient become hypotensive. He was placed in trendelenburg position, which 

improved the blood pressure, and he was provided a 1L fluid bolus. EKG was 

noted not significantly changed from prior EKG upon admission. Vital signs 

performed prior to discharge, and noted patient was not orthostatic. Discussed 

importance of judicial oral hydration, and outpatient physical therapy rehab to 

build lower extremity strength. Discharged to [facility] to continue home medications

, and follow up with primary care physician, cardiologist, and orthopedic 

surgeon.  





Minutes to complete discharge: 45





Discharge Summary


Reason For Visit: FRACTURE OF HIP


Condition: Stable





- Instructions


Diet, Activity, Other Instructions: 


You were admitted to hospital after a fall. You were evaluated by orthopedic 

surgery, and were found to have a left hip fracture. You had a surgery to fix 

the hip fracture and are being discharged to [facility] for rehab. 





Continue taking your home medications as directed.


You may take Tylenol for pain control. Take 650mg every 6 hours with water, and 

DO NOT exceed the maximum dose of 4000mg within 24 hours.  





Follow up with your primary care physician within two- three days of discharge.


Follow up with orthopedic surgeon Dr. Mei within one week of discharge. 


Follow up with cardiologist Dr. Anders within one week of discharge





Return to the nearest emergency department if you experience worsening symptoms

, fevers, chills, shortness of breath, chest pain, palpitations, abdominal pain

, nausea, vomiting. 


Referrals: 


Del Anders MD [Staff Physician] - 


Kel Mei DO [Staff Physician] - 


Schoen,Herbert, MD [Primary Care Provider] - 


Disposition: SKILLED NURSING FACILITY





- Home Medications


Comprehensive Discharge Medication List: 


Ambulatory Orders





Apixaban [Eliquis] 5 mg PO BID 01/24/19 


Donepezil HCl 5 mg PO DAILY 01/24/19 


Lisinopril [Zestril] 40 mg PO DAILY 01/24/19 


Tamsulosin HCl [Flomax] 0.4 cap PO HS 01/24/19 








This patient is new to me today: No


Emergency Visit: Yes


ED Registration Date: 01/23/19


Care time: The patient presented to the Emergency Department on the above date 

and was hospitalized for further evaluation of their emergent condition.


Critical Care patient: No





- Discharge Referral


Referred to [facility] No

## 2019-01-28 NOTE — PN
Teaching Attending Note


Name of Resident: Eliecer Obrien





ATTENDING PHYSICIAN STATEMENT





I saw and evaluated the patient.


I reviewed the resident's note and discussed the case with the resident.


I agree with the resident's findings and plan as documented.








SUBJECTIVE: asymptomatic since yesterday episode








OBJECTIVE:


 Vital Signs











 Period  Temp  Pulse  Resp  BP Sys/Nazario  Pulse Ox


 


 Last 24 Hr  97.4 F-98.9 F    18-20  /23-74  95-95











[place] man not in distress denies any comaplints





HEENT: Mm moist, no anemi,a pERRLA EOMI


NECK: No JVd No Bruit


CHEST: CTA B/L


CVS: S1S2 r no m/g/r


ABD: No distention, non tender BS +


EXT: S/P Left femur surgery


CNS: AOX3 non focal





ASSESSMENT AND PLAN:5 year old man with a history of atrial fib, HTN who 

presented to the ED with left hip pain after a fall, imaging shows . 

Intertrochanteric left femur fracture s/p cephalomedullary nail fixation 1/24, 

yesterday had an episode of TLOC with Hypotention and bradycardia consisted 

with neurocardiogenic/orthostatic presyncope, normal serail EKGs  orthosttic -ve








Active Medications





Apixaban (Eliquis -)  5 mg PO BID [facility]


   Last Admin: 01/27/19 22:16 Dose:  5 mg


Lisinopril (Prinivil)  40 mg PO DAILY [facility] hospital


   Last Admin: 01/27/19 09:14 Dose:  40 mg


Morphine Sulfate (Morphine Sulfate)  2 mg IVPUSH Q6H PRN


   PRN Reason: PAIN LEVEL 7 - 10


   Last Admin: 01/26/19 09:18 Dose:  2 mg


Ondansetron HCl (Zofran Injection)  8 mg IVPB Q6H PRN


   PRN Reason: NAUSEA


Oxycodone HCl (Roxicodone -)  5 mg PO Q4H PRN


   PRN Reason: PAIN LEVEL 1-5


   Stop: 01/30/19 09:31


Tamsulosin HCl (Flomax -)  0.4 mg PO HS [facility] hospital


   Last Admin: 01/27/19 22:16 Dose:  0.4 mg








Plan : educated gradual ambulation and PT as toleartes, can be Dc to Rehab





Problem List





- Problems


(1) Intertrochanteric fracture of left femur


Assessment/Plan: 


s/p surgery needs PT and Dc to [facility]


Code(s): S72.142A - DISPLACED INTERTROCHANTERIC FRACTURE OF LEFT FEMUR, INIT   





(2) Afib


Assessment/Plan: 


rate controlled on Apaxiban


Code(s): I48.91 - UNSPECIFIED ATRIAL FIBRILLATION   


Qualifiers: 


   Atrial fibrillation type: permanent   Qualified Code(s): I48.2 - Chronic 

atrial fibrillation   





(3) Pre-syncope


Assessment/Plan: 


Neurocradiogenic  now asymptomatic


Code(s): R55 - SYNCOPE AND COLLAPSE   





(4) HTN (hypertension)


Assessment/Plan: 


well controlled on Lisinopril


Code(s): I10 - ESSENTIAL (PRIMARY) HYPERTENSION

## 2019-03-18 ENCOUNTER — APPOINTMENT (OUTPATIENT)
Dept: ORTHOPEDIC SURGERY | Facility: CLINIC | Age: 84
End: 2019-03-18
Payer: MEDICARE

## 2019-03-18 VITALS — DIASTOLIC BLOOD PRESSURE: 76 MMHG | SYSTOLIC BLOOD PRESSURE: 153 MMHG | HEART RATE: 84 BPM

## 2019-03-18 DIAGNOSIS — Z96.653 PRESENCE OF ARTIFICIAL KNEE JOINT, BILATERAL: ICD-10-CM

## 2019-03-18 PROCEDURE — 73562 X-RAY EXAM OF KNEE 3: CPT | Mod: LT

## 2019-03-18 PROCEDURE — 99213 OFFICE O/P EST LOW 20 MIN: CPT

## 2019-05-14 ENCOUNTER — APPOINTMENT (OUTPATIENT)
Dept: ORTHOPEDIC SURGERY | Facility: CLINIC | Age: 84
End: 2019-05-14

## 2019-05-21 ENCOUNTER — APPOINTMENT (OUTPATIENT)
Dept: ORTHOPEDIC SURGERY | Facility: CLINIC | Age: 84
End: 2019-05-21